# Patient Record
Sex: MALE | Race: WHITE | NOT HISPANIC OR LATINO | Employment: FULL TIME | URBAN - METROPOLITAN AREA
[De-identification: names, ages, dates, MRNs, and addresses within clinical notes are randomized per-mention and may not be internally consistent; named-entity substitution may affect disease eponyms.]

---

## 2023-07-10 DIAGNOSIS — E03.9 HYPOTHYROIDISM (ACQUIRED): Primary | ICD-10-CM

## 2023-07-10 RX ORDER — LEVOTHYROXINE SODIUM 175 UG/1
175 TABLET ORAL DAILY
Qty: 90 TABLET | Refills: 1 | Status: SHIPPED | OUTPATIENT
Start: 2023-07-10 | End: 2024-01-04

## 2023-07-11 DIAGNOSIS — N52.9 IMPOTENCE: Primary | ICD-10-CM

## 2023-07-11 RX ORDER — TESTOSTERONE CYPIONATE 200 MG/ML
200 INJECTION, SOLUTION INTRAMUSCULAR
Qty: 2 ML | Refills: 1 | Status: SHIPPED | OUTPATIENT
Start: 2023-07-11 | End: 2023-07-18 | Stop reason: SDUPTHER

## 2023-07-11 RX ORDER — TESTOSTERONE CYPIONATE 200 MG/ML
INJECTION, SOLUTION INTRAMUSCULAR
COMMUNITY
Start: 2021-03-17 | End: 2023-07-11 | Stop reason: SDUPTHER

## 2023-07-18 ENCOUNTER — TELEPHONE (OUTPATIENT)
Dept: PRIMARY CARE | Facility: CLINIC | Age: 58
End: 2023-07-18
Payer: COMMERCIAL

## 2023-07-18 DIAGNOSIS — N52.9 IMPOTENCE: ICD-10-CM

## 2023-07-18 RX ORDER — TESTOSTERONE CYPIONATE 200 MG/ML
INJECTION, SOLUTION INTRAMUSCULAR
Qty: 6 ML | Refills: 1 | Status: SHIPPED | OUTPATIENT
Start: 2023-07-18 | End: 2023-07-19 | Stop reason: SDUPTHER

## 2023-07-19 ENCOUNTER — TELEPHONE (OUTPATIENT)
Dept: PRIMARY CARE | Facility: CLINIC | Age: 58
End: 2023-07-19
Payer: COMMERCIAL

## 2023-07-19 DIAGNOSIS — N52.9 IMPOTENCE: ICD-10-CM

## 2023-07-19 RX ORDER — TESTOSTERONE CYPIONATE 200 MG/ML
INJECTION, SOLUTION INTRAMUSCULAR
Qty: 6 ML | Refills: 1 | Status: SHIPPED | OUTPATIENT
Start: 2023-07-19 | End: 2023-10-10 | Stop reason: SDUPTHER

## 2023-07-19 NOTE — TELEPHONE ENCOUNTER
THE PHARMACY IS CALLING BECAUSE THE TESTOSTERONE SAYS TO INJECT EVERY 3 WEEKS BUT IT DOESN'T SAY HOW MUCH TO INJECT EVERY 3 WEEKS SO THEY NEED THAT PRESCRIPTION UPDATED AND SENT BACK TO THEM   [phone]

## 2023-07-24 DIAGNOSIS — I10 PRIMARY HYPERTENSION: Primary | ICD-10-CM

## 2023-07-24 RX ORDER — LISINOPRIL AND HYDROCHLOROTHIAZIDE 12.5; 2 MG/1; MG/1
2 TABLET ORAL DAILY
Qty: 180 TABLET | Refills: 1 | Status: SHIPPED | OUTPATIENT
Start: 2023-07-24 | End: 2023-10-10 | Stop reason: SDUPTHER

## 2023-09-25 LAB
ANION GAP IN SER/PLAS: 15 MMOL/L (ref 10–20)
APPEARANCE, URINE: CLEAR
BILIRUBIN, URINE: NEGATIVE
BLOOD, URINE: NEGATIVE
CALCIUM (MG/DL) IN SER/PLAS: 10.1 MG/DL (ref 8.6–10.6)
CARBON DIOXIDE, TOTAL (MMOL/L) IN SER/PLAS: 29 MMOL/L (ref 21–32)
CHLORIDE (MMOL/L) IN SER/PLAS: 99 MMOL/L (ref 98–107)
COLOR, URINE: YELLOW
CREATININE (MG/DL) IN SER/PLAS: 0.98 MG/DL (ref 0.5–1.3)
ERYTHROCYTE DISTRIBUTION WIDTH (RATIO) BY AUTOMATED COUNT: 12.2 % (ref 11.5–14.5)
ERYTHROCYTE MEAN CORPUSCULAR HEMOGLOBIN CONCENTRATION (G/DL) BY AUTOMATED: 32.9 G/DL (ref 32–36)
ERYTHROCYTE MEAN CORPUSCULAR VOLUME (FL) BY AUTOMATED COUNT: 93 FL (ref 80–100)
ERYTHROCYTES (10*6/UL) IN BLOOD BY AUTOMATED COUNT: 5.46 X10E12/L (ref 4.5–5.9)
GFR MALE: 89 ML/MIN/1.73M2
GLUCOSE (MG/DL) IN SER/PLAS: 89 MG/DL (ref 74–99)
GLUCOSE, URINE: NEGATIVE MG/DL
HEMATOCRIT (%) IN BLOOD BY AUTOMATED COUNT: 50.7 % (ref 41–52)
HEMOGLOBIN (G/DL) IN BLOOD: 16.7 G/DL (ref 13.5–17.5)
KETONES, URINE: NEGATIVE MG/DL
LEUKOCYTE ESTERASE, URINE: NEGATIVE
LEUKOCYTES (10*3/UL) IN BLOOD BY AUTOMATED COUNT: 12.1 X10E9/L (ref 4.4–11.3)
NITRITE, URINE: NEGATIVE
NRBC (PER 100 WBCS) BY AUTOMATED COUNT: 0 /100 WBC (ref 0–0)
PH, URINE: 5 (ref 5–8)
PLATELETS (10*3/UL) IN BLOOD AUTOMATED COUNT: 390 X10E9/L (ref 150–450)
POTASSIUM (MMOL/L) IN SER/PLAS: 5 MMOL/L (ref 3.5–5.3)
PROTEIN, URINE: NEGATIVE MG/DL
SODIUM (MMOL/L) IN SER/PLAS: 138 MMOL/L (ref 136–145)
SPECIFIC GRAVITY, URINE: 1.02 (ref 1–1.03)
THYROTROPIN (MIU/L) IN SER/PLAS BY DETECTION LIMIT <= 0.05 MIU/L: 2.4 MIU/L (ref 0.44–3.98)
UREA NITROGEN (MG/DL) IN SER/PLAS: 18 MG/DL (ref 6–23)
UROBILINOGEN, URINE: <2 MG/DL (ref 0–1.9)

## 2023-09-28 LAB — VITAMIN D 1,25-DIHYDROXY: 42.4 PG/ML (ref 19.9–79.3)

## 2023-10-10 ENCOUNTER — OFFICE VISIT (OUTPATIENT)
Dept: PRIMARY CARE | Facility: CLINIC | Age: 58
End: 2023-10-10
Payer: COMMERCIAL

## 2023-10-10 VITALS
WEIGHT: 315 LBS | BODY MASS INDEX: 42.66 KG/M2 | HEIGHT: 72 IN | OXYGEN SATURATION: 94 % | DIASTOLIC BLOOD PRESSURE: 86 MMHG | HEART RATE: 83 BPM | SYSTOLIC BLOOD PRESSURE: 132 MMHG | TEMPERATURE: 98 F

## 2023-10-10 DIAGNOSIS — E78.49 OTHER HYPERLIPIDEMIA: ICD-10-CM

## 2023-10-10 DIAGNOSIS — N52.9 IMPOTENCE: ICD-10-CM

## 2023-10-10 DIAGNOSIS — R73.03 PRE-DIABETES: ICD-10-CM

## 2023-10-10 DIAGNOSIS — L25.9 CONTACT DERMATITIS, UNSPECIFIED CONTACT DERMATITIS TYPE, UNSPECIFIED TRIGGER: ICD-10-CM

## 2023-10-10 DIAGNOSIS — R68.82 LIBIDO, DECREASED: ICD-10-CM

## 2023-10-10 DIAGNOSIS — Z12.5 PROSTATE CANCER SCREENING: ICD-10-CM

## 2023-10-10 DIAGNOSIS — E03.9 ACQUIRED HYPOTHYROIDISM: ICD-10-CM

## 2023-10-10 DIAGNOSIS — E29.1 HYPOGONADISM MALE: ICD-10-CM

## 2023-10-10 DIAGNOSIS — I10 BENIGN ESSENTIAL HYPERTENSION: Primary | ICD-10-CM

## 2023-10-10 DIAGNOSIS — I10 PRIMARY HYPERTENSION: ICD-10-CM

## 2023-10-10 PROBLEM — E78.5 HYPERLIPIDEMIA: Status: ACTIVE | Noted: 2023-10-10

## 2023-10-10 PROCEDURE — 3079F DIAST BP 80-89 MM HG: CPT | Performed by: INTERNAL MEDICINE

## 2023-10-10 PROCEDURE — 3075F SYST BP GE 130 - 139MM HG: CPT | Performed by: INTERNAL MEDICINE

## 2023-10-10 PROCEDURE — 90471 IMMUNIZATION ADMIN: CPT | Performed by: INTERNAL MEDICINE

## 2023-10-10 PROCEDURE — 93000 ELECTROCARDIOGRAM COMPLETE: CPT | Performed by: INTERNAL MEDICINE

## 2023-10-10 PROCEDURE — 99214 OFFICE O/P EST MOD 30 MIN: CPT | Performed by: INTERNAL MEDICINE

## 2023-10-10 PROCEDURE — 90686 IIV4 VACC NO PRSV 0.5 ML IM: CPT | Performed by: INTERNAL MEDICINE

## 2023-10-10 RX ORDER — TRIAMCINOLONE ACETONIDE 1 MG/G
CREAM TOPICAL
COMMUNITY
Start: 2017-11-08 | End: 2023-10-10 | Stop reason: SDUPTHER

## 2023-10-10 RX ORDER — TESTOSTERONE CYPIONATE 200 MG/ML
INJECTION, SOLUTION INTRAMUSCULAR
Qty: 6 ML | Refills: 1 | Status: SHIPPED | OUTPATIENT
Start: 2023-10-10 | End: 2024-02-28 | Stop reason: SDUPTHER

## 2023-10-10 RX ORDER — SILDENAFIL 100 MG/1
TABLET, FILM COATED ORAL
COMMUNITY
Start: 2019-03-13 | End: 2023-10-10 | Stop reason: SDUPTHER

## 2023-10-10 RX ORDER — LISINOPRIL AND HYDROCHLOROTHIAZIDE 12.5; 2 MG/1; MG/1
2 TABLET ORAL DAILY
Qty: 180 TABLET | Refills: 1 | Status: SHIPPED | OUTPATIENT
Start: 2023-10-10 | End: 2024-04-08

## 2023-10-10 RX ORDER — TRIAMCINOLONE ACETONIDE 1 MG/G
CREAM TOPICAL 2 TIMES DAILY
Qty: 45 G | Refills: 1 | Status: SHIPPED | OUTPATIENT
Start: 2023-10-10 | End: 2023-12-09

## 2023-10-10 RX ORDER — SILDENAFIL 100 MG/1
100 TABLET, FILM COATED ORAL DAILY PRN
Qty: 10 TABLET | Refills: 1 | Status: SHIPPED | OUTPATIENT
Start: 2023-10-10 | End: 2024-05-29 | Stop reason: SDUPTHER

## 2023-10-10 ASSESSMENT — PATIENT HEALTH QUESTIONNAIRE - PHQ9
1. LITTLE INTEREST OR PLEASURE IN DOING THINGS: NOT AT ALL
SUM OF ALL RESPONSES TO PHQ9 QUESTIONS 1 AND 2: 0
2. FEELING DOWN, DEPRESSED OR HOPELESS: NOT AT ALL

## 2023-10-10 ASSESSMENT — LIFESTYLE VARIABLES
HOW MANY STANDARD DRINKS CONTAINING ALCOHOL DO YOU HAVE ON A TYPICAL DAY: PATIENT DOES NOT DRINK
AUDIT-C TOTAL SCORE: 0
HOW OFTEN DO YOU HAVE A DRINK CONTAINING ALCOHOL: NEVER
SKIP TO QUESTIONS 9-10: 1
HOW OFTEN DO YOU HAVE SIX OR MORE DRINKS ON ONE OCCASION: NEVER

## 2023-10-10 ASSESSMENT — COLUMBIA-SUICIDE SEVERITY RATING SCALE - C-SSRS: 1. IN THE PAST MONTH, HAVE YOU WISHED YOU WERE DEAD OR WISHED YOU COULD GO TO SLEEP AND NOT WAKE UP?: NO

## 2023-10-10 NOTE — PROGRESS NOTES
Subjective   Patient ID: Jose Franco is a 58 y.o. male who presents for Follow-up.    HPI it on hyperlipidemia, hypogonadism, hypothyroidism, seasonal affective disorder, decreased libido, obesity, erectile dysfunction, hypertension,eczema,allergic rhinitis, coronary artery calcium score of zero in 2020 vitamin D deficiency, benign colon polyp, and ejection fraction of 65% per record done in 2020 .He is doing fairly well and denies any rashes, chest pain, cough, congestion, abdominal pain, dysuria and fever. La     Review of Systems    Objective   /86   Pulse 83   Temp 36.7 °C (98 °F)   Ht 1.829 m (6')   Wt 150 kg (330 lb)   SpO2 94%   BMI 44.76 kg/m²     Physical Exam    Assessment/Plan

## 2023-10-10 NOTE — PROGRESS NOTES
Subjective   Patient ID: Jose Franco is a 58 y.o. male who presents for Follow-up.    HPI patient presents to clinic for  follow-up visit on hyperlipidemia, hypogonadism, hypothyroidism, seasonal affective disorder, decreased libido, obesity, erectile dysfunction, hypertension,eczema,allergic rhinitis, coronary artery calcium score of zero in 2020 vitamin D deficiency, benign colon polyp, and ejection fraction of 65% per record done in 2020 .He is doing well and denies any cough, congestion, abdominal pain nausea vomiting and swelling of legs.  Laboratory studies done shows potassium of 5.0 glucose of 89, serum calcium of 10.1 WBC of 12.1 hemoglobin of 16.7 and other studies are unremarkable.  EKG done shows sinus rhythm at 87 bpm with normal axis normal interval without any ischemic changes.    Review of Systems   Constitutional: Negative.    HENT: Negative.     Eyes: Negative.    Respiratory: Negative.     Cardiovascular: Negative.    Gastrointestinal: Negative.    Endocrine: Negative.    Genitourinary: Negative.    Musculoskeletal: Negative.    Skin: Negative.    Allergic/Immunologic: Negative.    Neurological: Negative.    Hematological: Negative.    Psychiatric/Behavioral: Negative.         Objective   /86   Pulse 83   Temp 36.7 °C (98 °F)   Ht 1.829 m (6')   Wt 150 kg (330 lb)   SpO2 94%   BMI 44.76 kg/m²     Physical Exam  Constitutional:       Appearance: Normal appearance. He is obese.   HENT:      Right Ear: Tympanic membrane normal.      Left Ear: Tympanic membrane and ear canal normal.      Nose: Nose normal.   Neck:      Vascular: No carotid bruit.   Cardiovascular:      Rate and Rhythm: Normal rate.   Pulmonary:      Effort: No respiratory distress.      Breath sounds: No stridor. No wheezing.   Abdominal:      Palpations: Abdomen is soft.      Tenderness: There is no guarding or rebound.   Skin:     Coloration: Skin is not jaundiced.      Findings: No bruising.   Neurological:       General: No focal deficit present.      Mental Status: He is alert and oriented to person, place, and time.   Psychiatric:         Mood and Affect: Mood normal.         Assessment/Plan    patient is encouraged to diet, exercise and do lifestyle modification for obesity.  He will continue current medications and will return to clinic in 6 months for follow-up visit.     OARRS:  No data recorded  Yes, I feel it is clincially indicated to continue the medication and have discussed with the patient risks/benefits/alternatives.    Is the patient prescribed a combination of a benzodiazepine and opioid?  Yes, I feel it is clincially indicated to continue the medication and have discussed with the patient risks/benefits/alternatives.    Last Urine Drug Screen / ordered today: Yes  Recent Results (from the past 8760 hour(s))   OPIATE/OPIOID/BENZO PRESCRIPTION COMPLIANCE    Collection Time: 02/02/23  9:17 AM   Result Value Ref Range    DRUG SCREEN COMMENT URINE SEE BELOW     Creatine, Urine 35.5 mg/dL    Amphetamine Screen, Urine PRESUMPTIVE NEGATIVE NEGATIVE    Barbiturate Screen, Urine PRESUMPTIVE NEGATIVE NEGATIVE    Cannabinoid Screen, Urine PRESUMPTIVE NEGATIVE NEGATIVE    Cocaine Screen, Urine PRESUMPTIVE NEGATIVE NEGATIVE    PCP Screen, Urine PRESUMPTIVE NEGATIVE NEGATIVE    7-Aminoclonazepam <25 Cutoff <25 ng/mL    Alpha-Hydroxyalprazolam <25 Cutoff <25 ng/mL    Alpha-Hydroxymidazolam <25 Cutoff <25 ng/mL    Alprazolam <25 Cutoff <25 ng/mL    Chlordiazepoxide <25 Cutoff <25 ng/mL    Clonazepam <25 Cutoff <25 ng/mL    Diazepam <25 Cutoff <25 ng/mL    Lorazepam <25 Cutoff <25 ng/mL    Midazolam <25 Cutoff <25 ng/mL    Nordiazepam <25 Cutoff <25 ng/mL    Oxazepam <25 Cutoff <25 ng/mL    Temazepam <25 Cutoff <25 ng/mL    Zolpidem <25 Cutoff <25 ng/mL    Zolpidem Metabolite (ZCA) <25 Cutoff <25 ng/mL    6-Acetylmorphine <25 Cutoff <25 ng/mL    Codeine <50 Cutoff <50 ng/mL    Hydrocodone <25 Cutoff <25 ng/mL     Hydromorphone <25 Cutoff <25 ng/mL    Morphine Urine <50 Cutoff <50 ng/mL    Norhydrocodone <25 Cutoff <25 ng/mL    Noroxycodone <25 Cutoff <25 ng/mL    Oxycodone <25 Cutoff <25 ng/mL    Oxymorphone <25 Cutoff <25 ng/mL    Tramadol <50 Cutoff <50 ng/mL    O-Desmethyltramadol <50 Cutoff <50 ng/mL    Fentanyl <2.5 Cutoff<2.5 ng/mL    Norfentanyl <2.5 Cutoff<2.5 ng/mL    METHADONE CONFIRMATION,URINE <25 Cutoff <25 ng/mL    EDDP <25 Cutoff <25 ng/mL     Results are as expected.         Controlled Substance Agreement:  Date of the Last Agreement: 10/10/23  Reviewed Controlled Substance Agreement including but not limited to the benefits, risks, and alternatives to treatment with a Controlled Substance medication(s).    Testosterone:  What is the patient's goal of therapy? Helps with decrease libido  Is this being achieved with current treatment? yes    I attest the patient does not have: Breast Cancer    Last Testosterone check:  Testosterone, Free   Date Value Ref Range Status   02/02/2023 119.6 35.0 - 155.0 pg/mL Final     Comment:     This test was developed and its analytical performance  characteristics have been determined by [place] It has  not been cleared or approved by the U.S. Food and Drug  Administration. This assay has been validated pursuant  to the CLIA regulations and is used for clinical  purposes.       Testosterone, Total, LC-MS/MS   Date Value Ref Range Status   02/02/2023 541 250 - 1,100 ng/dL Final     Comment:     For additional information, please refer to  http://education.[employer]/faq/  [id number]  (This link is being provided for informational/  educational purposes only.)     This test was developed and its analytical performance  characteristics have been determined by [facility] [place], [place]. It has  not been cleared or approved by the U.S. Food and Drug  Administration. This assay has been  "validated pursuant  to the CLIA regulations and is used for clinical  purposes.         Last CBC:    No results found for: \"CBCDIF\", \"BMCBC\", \"PR1\"    Last PSA:   PSA   Date Value Ref Range Status   05/06/2020 2.81 0.00 - 4.00 ng/mL Final     Comment:     The FDA requires that the method used for PSA assay be   reported to the physician. Values obtained with different   assay methods must not be used interchangeably. This test   was performed at [facility] using the Siemens  Atellica PSA method, which is a sandwich immunoassay using   chemiluminescence for quantitation. The assay is approved  for measurement of prostate-specific antigen (PSA) in   serum and may be used in conjunction with a digital rectal  examination in men 50 years and older as an aid in   detection of prostate cancer.   5-Alpha-reductase inhibitors (e.g. Proscar, Finasteride,   Avodart, Dutasteride and Laura) for the treatment of BPH   have been shown to lower PSA levels by an average of 50%   after 6 months of treatment.       Prostate Specific Antigen,Screen   Date Value Ref Range Status   02/02/2023 3.39 0.00 - 4.00 ng/mL Final     Comment:     The FDA requires that the method used for PSA assay be   reported to the physician. Values obtained with different   assay methods must not be used interchangeably. This test   was performed at [facility] using the Siemens  Atellica PSA method, which is a sandwich immunoassay using   chemiluminescence for quantitation. The assay is approved  for measurement of prostate-specific antigen (PSA) in   serum and may be used in conjunction with a digital rectal  examination in men 50 years and older as an aid in   detection of prostate cancer.   5-Alpha-reductase inhibitors (e.g. Proscar, Finasteride,   Avodart, Dutasteride and Laura) for the treatment of BPH   have been shown to lower PSA levels by an average of 50%   after 6 months of treatment.         Activities of Daily " Living:   Is your overall impression that this patient is benefiting (symptom reduction outweighs side effects) from testosterone therapy? Yes     1. Physical Functioning: Better  2. Family Relationship: Better  3. Social Relationship: Better  4. Mood: Better  5. Sleep Patterns: Better  6. Overall Function: Better

## 2023-10-10 NOTE — PROGRESS NOTES
Subjective   Patient ID: Jose Franco is a 58 y.o. male who presents for Follow-up.    HPI     Review of Systems    Objective   /86   Pulse 83   Temp 36.7 °C (98 °F)   Ht 1.829 m (6')   Wt 150 kg (330 lb)   SpO2 94%   BMI 44.76 kg/m²     Physical Exam    Assessment/Plan

## 2023-10-12 ASSESSMENT — ENCOUNTER SYMPTOMS
EYES NEGATIVE: 1
CONSTITUTIONAL NEGATIVE: 1
HEMATOLOGIC/LYMPHATIC NEGATIVE: 1
GASTROINTESTINAL NEGATIVE: 1
ALLERGIC/IMMUNOLOGIC NEGATIVE: 1
CARDIOVASCULAR NEGATIVE: 1
NEUROLOGICAL NEGATIVE: 1
MUSCULOSKELETAL NEGATIVE: 1
ENDOCRINE NEGATIVE: 1
RESPIRATORY NEGATIVE: 1
PSYCHIATRIC NEGATIVE: 1

## 2024-01-04 DIAGNOSIS — E03.9 HYPOTHYROIDISM (ACQUIRED): ICD-10-CM

## 2024-01-04 RX ORDER — LEVOTHYROXINE SODIUM 175 UG/1
175 TABLET ORAL DAILY
Qty: 90 TABLET | Refills: 3 | Status: SHIPPED | OUTPATIENT
Start: 2024-01-04 | End: 2024-05-29 | Stop reason: SDUPTHER

## 2024-02-28 DIAGNOSIS — N52.9 IMPOTENCE: ICD-10-CM

## 2024-02-28 RX ORDER — SYRINGE WITH NEEDLE, 1 ML 25GX5/8"
SYRINGE, EMPTY DISPOSABLE MISCELLANEOUS
COMMUNITY
Start: 2023-10-24 | End: 2024-02-28 | Stop reason: SDUPTHER

## 2024-02-29 RX ORDER — TESTOSTERONE CYPIONATE 200 MG/ML
INJECTION, SOLUTION INTRAMUSCULAR
Qty: 6 ML | Refills: 1 | Status: SHIPPED | OUTPATIENT
Start: 2024-02-29

## 2024-02-29 RX ORDER — SYRINGE WITH NEEDLE, 1 ML 25GX5/8"
1 SYRINGE, EMPTY DISPOSABLE MISCELLANEOUS DAILY
Qty: 30 EACH | Refills: 0 | Status: SHIPPED | OUTPATIENT
Start: 2024-02-29

## 2024-04-08 DIAGNOSIS — I10 PRIMARY HYPERTENSION: ICD-10-CM

## 2024-04-08 RX ORDER — LISINOPRIL AND HYDROCHLOROTHIAZIDE 12.5; 2 MG/1; MG/1
2 TABLET ORAL DAILY
Qty: 180 TABLET | Refills: 3 | Status: SHIPPED | OUTPATIENT
Start: 2024-04-08 | End: 2024-05-29 | Stop reason: SDUPTHER

## 2024-04-17 ENCOUNTER — LAB (OUTPATIENT)
Dept: LAB | Facility: LAB | Age: 59
End: 2024-04-17
Payer: COMMERCIAL

## 2024-04-17 DIAGNOSIS — R73.03 PRE-DIABETES: ICD-10-CM

## 2024-04-17 DIAGNOSIS — Z12.5 PROSTATE CANCER SCREENING: ICD-10-CM

## 2024-04-17 DIAGNOSIS — E29.1 HYPOGONADISM MALE: ICD-10-CM

## 2024-04-17 DIAGNOSIS — I10 BENIGN ESSENTIAL HYPERTENSION: ICD-10-CM

## 2024-04-17 DIAGNOSIS — E78.49 OTHER HYPERLIPIDEMIA: ICD-10-CM

## 2024-04-17 LAB
ALBUMIN SERPL BCP-MCNC: 4.5 G/DL (ref 3.4–5)
ALP SERPL-CCNC: 47 U/L (ref 33–120)
ALT SERPL W P-5'-P-CCNC: 34 U/L (ref 10–52)
ANION GAP SERPL CALC-SCNC: 17 MMOL/L (ref 10–20)
APPEARANCE UR: CLEAR
AST SERPL W P-5'-P-CCNC: 23 U/L (ref 9–39)
BASOPHILS # BLD AUTO: 0.1 X10*3/UL (ref 0–0.1)
BASOPHILS NFR BLD AUTO: 1 %
BILIRUB SERPL-MCNC: 0.7 MG/DL (ref 0–1.2)
BILIRUB UR STRIP.AUTO-MCNC: NEGATIVE MG/DL
BUN SERPL-MCNC: 21 MG/DL (ref 6–23)
CALCIUM SERPL-MCNC: 10.1 MG/DL (ref 8.6–10.6)
CHLORIDE SERPL-SCNC: 100 MMOL/L (ref 98–107)
CHOLEST SERPL-MCNC: 195 MG/DL (ref 0–199)
CHOLESTEROL/HDL RATIO: 4.5
CO2 SERPL-SCNC: 27 MMOL/L (ref 21–32)
COLOR UR: NORMAL
CREAT SERPL-MCNC: 0.98 MG/DL (ref 0.5–1.3)
EGFRCR SERPLBLD CKD-EPI 2021: 89 ML/MIN/1.73M*2
EOSINOPHIL # BLD AUTO: 0.18 X10*3/UL (ref 0–0.7)
EOSINOPHIL NFR BLD AUTO: 1.7 %
ERYTHROCYTE [DISTWIDTH] IN BLOOD BY AUTOMATED COUNT: 12.6 % (ref 11.5–14.5)
EST. AVERAGE GLUCOSE BLD GHB EST-MCNC: 111 MG/DL
GLUCOSE SERPL-MCNC: 90 MG/DL (ref 74–99)
GLUCOSE UR STRIP.AUTO-MCNC: NORMAL MG/DL
HBA1C MFR BLD: 5.5 %
HCT VFR BLD AUTO: 51.8 % (ref 41–52)
HDLC SERPL-MCNC: 43.2 MG/DL
HGB BLD-MCNC: 17.4 G/DL (ref 13.5–17.5)
IMM GRANULOCYTES # BLD AUTO: 0.06 X10*3/UL (ref 0–0.7)
IMM GRANULOCYTES NFR BLD AUTO: 0.6 % (ref 0–0.9)
KETONES UR STRIP.AUTO-MCNC: NEGATIVE MG/DL
LDLC SERPL CALC-MCNC: 129 MG/DL
LEUKOCYTE ESTERASE UR QL STRIP.AUTO: NEGATIVE
LYMPHOCYTES # BLD AUTO: 2.62 X10*3/UL (ref 1.2–4.8)
LYMPHOCYTES NFR BLD AUTO: 25 %
MCH RBC QN AUTO: 30.5 PG (ref 26–34)
MCHC RBC AUTO-ENTMCNC: 33.6 G/DL (ref 32–36)
MCV RBC AUTO: 91 FL (ref 80–100)
MONOCYTES # BLD AUTO: 0.88 X10*3/UL (ref 0.1–1)
MONOCYTES NFR BLD AUTO: 8.4 %
NEUTROPHILS # BLD AUTO: 6.64 X10*3/UL (ref 1.2–7.7)
NEUTROPHILS NFR BLD AUTO: 63.3 %
NITRITE UR QL STRIP.AUTO: NEGATIVE
NON HDL CHOLESTEROL: 152 MG/DL (ref 0–149)
NRBC BLD-RTO: 0 /100 WBCS (ref 0–0)
PH UR STRIP.AUTO: 6.5 [PH]
PLATELET # BLD AUTO: 399 X10*3/UL (ref 150–450)
POTASSIUM SERPL-SCNC: 4.5 MMOL/L (ref 3.5–5.3)
PROT SERPL-MCNC: 7.4 G/DL (ref 6.4–8.2)
PROT UR STRIP.AUTO-MCNC: NEGATIVE MG/DL
PSA SERPL-MCNC: 3.32 NG/ML
RBC # BLD AUTO: 5.71 X10*6/UL (ref 4.5–5.9)
RBC # UR STRIP.AUTO: NEGATIVE /UL
SODIUM SERPL-SCNC: 139 MMOL/L (ref 136–145)
SP GR UR STRIP.AUTO: 1.02
TRIGL SERPL-MCNC: 116 MG/DL (ref 0–149)
UROBILINOGEN UR STRIP.AUTO-MCNC: NORMAL MG/DL
VLDL: 23 MG/DL (ref 0–40)
WBC # BLD AUTO: 10.5 X10*3/UL (ref 4.4–11.3)

## 2024-04-17 PROCEDURE — 83036 HEMOGLOBIN GLYCOSYLATED A1C: CPT

## 2024-04-17 PROCEDURE — 81003 URINALYSIS AUTO W/O SCOPE: CPT

## 2024-04-17 PROCEDURE — 84153 ASSAY OF PSA TOTAL: CPT

## 2024-04-17 PROCEDURE — 84402 ASSAY OF FREE TESTOSTERONE: CPT

## 2024-04-17 PROCEDURE — 80061 LIPID PANEL: CPT

## 2024-04-17 PROCEDURE — 80053 COMPREHEN METABOLIC PANEL: CPT

## 2024-04-17 PROCEDURE — 85025 COMPLETE CBC W/AUTO DIFF WBC: CPT

## 2024-04-17 PROCEDURE — 36415 COLL VENOUS BLD VENIPUNCTURE: CPT

## 2024-04-21 LAB
TESTOSTERONE FREE (CHAN): 127.3 PG/ML (ref 35–155)
TESTOSTERONE,TOTAL,LC-MS/MS: 486 NG/DL (ref 250–1100)

## 2024-05-29 ENCOUNTER — OFFICE VISIT (OUTPATIENT)
Dept: PRIMARY CARE | Facility: CLINIC | Age: 59
End: 2024-05-29
Payer: COMMERCIAL

## 2024-05-29 VITALS
HEIGHT: 72 IN | OXYGEN SATURATION: 95 % | SYSTOLIC BLOOD PRESSURE: 100 MMHG | BODY MASS INDEX: 42.66 KG/M2 | HEART RATE: 85 BPM | WEIGHT: 315 LBS | DIASTOLIC BLOOD PRESSURE: 63 MMHG

## 2024-05-29 DIAGNOSIS — E78.49 OTHER HYPERLIPIDEMIA: ICD-10-CM

## 2024-05-29 DIAGNOSIS — I10 PRIMARY HYPERTENSION: ICD-10-CM

## 2024-05-29 DIAGNOSIS — E03.9 ACQUIRED HYPOTHYROIDISM: Primary | ICD-10-CM

## 2024-05-29 DIAGNOSIS — I10 BENIGN ESSENTIAL HYPERTENSION: ICD-10-CM

## 2024-05-29 DIAGNOSIS — E66.9 OBESITY WITH SERIOUS COMORBIDITY, UNSPECIFIED CLASSIFICATION, UNSPECIFIED OBESITY TYPE: ICD-10-CM

## 2024-05-29 DIAGNOSIS — R68.82 LIBIDO, DECREASED: ICD-10-CM

## 2024-05-29 DIAGNOSIS — E29.1 HYPOGONADISM MALE: ICD-10-CM

## 2024-05-29 DIAGNOSIS — E03.9 HYPOTHYROIDISM (ACQUIRED): ICD-10-CM

## 2024-05-29 PROCEDURE — 3074F SYST BP LT 130 MM HG: CPT | Performed by: INTERNAL MEDICINE

## 2024-05-29 PROCEDURE — 3078F DIAST BP <80 MM HG: CPT | Performed by: INTERNAL MEDICINE

## 2024-05-29 PROCEDURE — 99214 OFFICE O/P EST MOD 30 MIN: CPT | Performed by: INTERNAL MEDICINE

## 2024-05-29 RX ORDER — LISINOPRIL AND HYDROCHLOROTHIAZIDE 12.5; 2 MG/1; MG/1
2 TABLET ORAL DAILY
Qty: 180 TABLET | Refills: 3 | Status: SHIPPED | OUTPATIENT
Start: 2024-05-29

## 2024-05-29 RX ORDER — SILDENAFIL 100 MG/1
100 TABLET, FILM COATED ORAL DAILY PRN
Qty: 10 TABLET | Refills: 3 | Status: SHIPPED | OUTPATIENT
Start: 2024-05-29 | End: 2024-07-28

## 2024-05-29 RX ORDER — SEMAGLUTIDE 0.25 MG/.5ML
0.25 INJECTION, SOLUTION SUBCUTANEOUS
Qty: 6 ML | Refills: 1 | Status: SHIPPED | OUTPATIENT
Start: 2024-06-02 | End: 2024-05-31 | Stop reason: SDUPTHER

## 2024-05-29 RX ORDER — LEVOTHYROXINE SODIUM 175 UG/1
175 TABLET ORAL DAILY
Qty: 90 TABLET | Refills: 3 | Status: SHIPPED | OUTPATIENT
Start: 2024-05-29

## 2024-05-29 NOTE — PROGRESS NOTES
Subjective   Patient ID: Jose Franco is a 58 y.o. male who presents for Follow-up (On blood work /And wants weight los wegovy ) and Med Refill (sildenafil (Viagra) 100 mg has to go to [facility] ).    HPI patient presents to clinic for follow-up visit on history of hyperlipidemia, hypogonadism, hypothyroidism, seasonal affective disorder, decreased libido, obesity, erectile dysfunction, hypertension,eczema,allergic rhinitis, coronary artery calcium score of zero in 2020 vitamin D deficiency, benign colon polyp, and ejection fraction of 65% per record done in 2020 .He is doing fairly well and is requesting weight loss medication in view of morbid obesity.  He denies any rashes, chest pain, cough, congestion, abdominal pain fever chills and palpitation.  Laboratory studies done shows hemoglobin of 17.4, cholesterol of 195, glucose of 90, hemoglobin A1c of 5.5% and other studies have been reviewed and discussed with him.  OARRS:  No data recorded  I have personally reviewed the OARRS report for Yassine Franco. I have considered the risks of abuse, dependence, addiction and diversion    Is the patient prescribed a combination of a benzodiazepine and opioid?  Yes, I feel it is clincially indicated to continue the medication and have discussed with the patient risks/benefits/alternatives.    Last Urine Drug Screen / ordered today: Yes  No results found for this or any previous visit (from the past 8760 hour(s)).  N/A          Controlled Substance Agreement:  Date of the Last Agreement: 10/10/23  Reviewed Controlled Substance Agreement including but not limited to the benefits, risks, and alternatives to treatment with a Controlled Substance medication(s).    Testosterone:  What is the patient's goal of therapy? To help erectile dysfunction  Is this being achieved with current treatment? yes    I attest the patient does not have: Breast Cancer    Last Testosterone check:  Testosterone, Free   Date Value Ref Range Status  "  04/17/2024 127.3 35.0 - 155.0 pg/mL Final     Comment:        This test was developed and its analytical performance  characteristics have been determined by [place]. It has  not been cleared or approved by the U.S. Food and Drug  Administration. This assay has been validated pursuant  to the CLIA regulations and is used for clinical  purposes.            Testosterone, Total, LC-MS/MS   Date Value Ref Range Status   04/17/2024 486 250 - 1100 ng/dL Final     Comment:        For additional information, please refer to  http://education.[employer]/faq/  [id number]  (This link is being provided for informational/  educational purposes only.)     This test was developed and its analytical performance  characteristics have been determined by [place]. It has  not been cleared or approved by the U.S. Food and Drug  Administration. This assay has been validated pursuant  to the CLIA regulations and is used for clinical  purposes.              Last CBC:    No results found for: \"CBCDIF\", \"BMCBC\", \"PR1\"    Last PSA:   PSA   Date Value Ref Range Status   05/06/2020 2.81 0.00 - 4.00 ng/mL Final     Comment:     The FDA requires that the method used for PSA assay be   reported to the physician. Values obtained with different   assay methods must not be used interchangeably. This test   was performed at [facility] using the Siemens  Atellica PSA method, which is a sandwich immunoassay using   chemiluminescence for quantitation. The assay is approved  for measurement of prostate-specific antigen (PSA) in   serum and may be used in conjunction with a digital rectal  examination in men 50 years and older as an aid in   detection of prostate cancer.   5-Alpha-reductase inhibitors (e.g. Proscar, Finasteride,   Avodart, Dutasteride and Laura) for the treatment of BPH   have been shown to lower PSA levels " by an average of 50%   after 6 months of treatment.       Prostate Specific Antigen,Screen   Date Value Ref Range Status   04/17/2024 3.32 <=4.00 ng/mL Final       Activities of Daily Living:   Is your overall impression that this patient is benefiting (symptom reduction outweighs side effects) from testosterone therapy? Yes     1. Physical Functioning: Better  2. Family Relationship: Better  3. Social Relationship: Better  4. Mood: Better  5. Sleep Patterns: Same  6. Overall Function: Same      Review of Systems   Constitutional: Negative.    HENT: Negative.     Eyes: Negative.    Respiratory: Negative.     Cardiovascular: Negative.    Gastrointestinal: Negative.    Endocrine: Negative.    Genitourinary: Negative.    Musculoskeletal: Negative.    Skin: Negative.    Allergic/Immunologic: Negative.    Neurological: Negative.    Hematological: Negative.    Psychiatric/Behavioral: Negative.         Objective   /63 (BP Location: Right arm, Patient Position: Sitting, BP Cuff Size: Adult)   Pulse 85   Ht 1.829 m (6')   Wt (!) 153 kg (338 lb)   SpO2 95%   BMI 45.84 kg/m²     Physical Exam  Constitutional:       Appearance: Normal appearance. He is obese.   HENT:      Right Ear: Tympanic membrane normal.      Left Ear: Tympanic membrane and ear canal normal.      Nose: Nose normal.   Neck:      Vascular: No carotid bruit.   Cardiovascular:      Rate and Rhythm: Normal rate.   Pulmonary:      Effort: No respiratory distress.      Breath sounds: No stridor. No wheezing.   Abdominal:      Palpations: Abdomen is soft.      Tenderness: There is no abdominal tenderness. There is no guarding or rebound.   Skin:     Coloration: Skin is not jaundiced.      Findings: No bruising.   Neurological:      General: No focal deficit present.      Mental Status: He is alert and oriented to person, place, and time.   Psychiatric:         Mood and Affect: Mood normal.         Assessment/Plan    patient vies to follow Mediterranean  diet, exercise and do lifestyle modification and is given trial with Wegovy regarding obesity.  He will continue other medications and will return to clinic in 3 months for follow-up visit.

## 2024-05-30 ENCOUNTER — TELEPHONE (OUTPATIENT)
Dept: PRIMARY CARE | Facility: CLINIC | Age: 59
End: 2024-05-30
Payer: COMMERCIAL

## 2024-05-30 DIAGNOSIS — E66.9 OBESITY WITH SERIOUS COMORBIDITY, UNSPECIFIED CLASSIFICATION, UNSPECIFIED OBESITY TYPE: ICD-10-CM

## 2024-05-31 ENCOUNTER — PHARMACY VISIT (OUTPATIENT)
Dept: PHARMACY | Facility: CLINIC | Age: 59
End: 2024-05-31
Payer: COMMERCIAL

## 2024-05-31 PROCEDURE — RXMED WILLOW AMBULATORY MEDICATION CHARGE

## 2024-05-31 RX ORDER — SEMAGLUTIDE 0.25 MG/.5ML
0.25 INJECTION, SOLUTION SUBCUTANEOUS
Qty: 2 ML | Refills: 3 | Status: SHIPPED | OUTPATIENT
Start: 2024-06-02 | End: 2025-06-02

## 2024-06-02 ASSESSMENT — ENCOUNTER SYMPTOMS
PSYCHIATRIC NEGATIVE: 1
CARDIOVASCULAR NEGATIVE: 1
RESPIRATORY NEGATIVE: 1
EYES NEGATIVE: 1
MUSCULOSKELETAL NEGATIVE: 1
ENDOCRINE NEGATIVE: 1
HEMATOLOGIC/LYMPHATIC NEGATIVE: 1
ALLERGIC/IMMUNOLOGIC NEGATIVE: 1
CONSTITUTIONAL NEGATIVE: 1
GASTROINTESTINAL NEGATIVE: 1
NEUROLOGICAL NEGATIVE: 1

## 2024-06-14 ENCOUNTER — PHARMACY VISIT (OUTPATIENT)
Dept: PHARMACY | Facility: CLINIC | Age: 59
End: 2024-06-14
Payer: COMMERCIAL

## 2024-06-14 DIAGNOSIS — E66.9 OBESITY WITH SERIOUS COMORBIDITY, UNSPECIFIED CLASSIFICATION, UNSPECIFIED OBESITY TYPE: ICD-10-CM

## 2024-06-14 PROCEDURE — RXMED WILLOW AMBULATORY MEDICATION CHARGE

## 2024-06-14 RX ORDER — SEMAGLUTIDE 0.25 MG/.5ML
0.5 INJECTION, SOLUTION SUBCUTANEOUS
Qty: 4 ML | Refills: 0 | Status: SHIPPED | OUTPATIENT
Start: 2024-06-16 | End: 2024-06-14 | Stop reason: SDUPTHER

## 2024-06-14 RX ORDER — SEMAGLUTIDE 0.25 MG/.5ML
0.5 INJECTION, SOLUTION SUBCUTANEOUS
Qty: 2 ML | Refills: 0 | Status: SHIPPED | OUTPATIENT
Start: 2024-06-14 | End: 2024-06-14 | Stop reason: SDUPTHER

## 2024-06-14 RX ORDER — SEMAGLUTIDE 0.5 MG/.5ML
0.5 INJECTION, SOLUTION SUBCUTANEOUS
Qty: 2 ML | Refills: 0 | Status: SHIPPED | OUTPATIENT
Start: 2024-06-16 | End: 2025-06-16

## 2024-07-08 DIAGNOSIS — E66.9 OBESITY WITH SERIOUS COMORBIDITY, UNSPECIFIED CLASSIFICATION, UNSPECIFIED OBESITY TYPE: Primary | ICD-10-CM

## 2024-07-08 DIAGNOSIS — I10 BENIGN ESSENTIAL HYPERTENSION: ICD-10-CM

## 2024-07-08 RX ORDER — SEMAGLUTIDE 1 MG/.5ML
1 INJECTION, SOLUTION SUBCUTANEOUS
Qty: 2 ML | Refills: 0 | Status: CANCELLED | OUTPATIENT
Start: 2024-07-14 | End: 2024-08-05

## 2024-07-09 PROCEDURE — RXMED WILLOW AMBULATORY MEDICATION CHARGE

## 2024-07-09 RX ORDER — SEMAGLUTIDE 1 MG/.5ML
1 INJECTION, SOLUTION SUBCUTANEOUS
Qty: 2 ML | Refills: 0 | Status: SHIPPED | OUTPATIENT
Start: 2024-07-14 | End: 2024-08-07

## 2024-07-10 ENCOUNTER — PHARMACY VISIT (OUTPATIENT)
Dept: PHARMACY | Facility: CLINIC | Age: 59
End: 2024-07-10
Payer: COMMERCIAL

## 2024-08-05 DIAGNOSIS — E78.49 OTHER HYPERLIPIDEMIA: Primary | ICD-10-CM

## 2024-08-05 RX ORDER — SEMAGLUTIDE 1.7 MG/.75ML
1.7 INJECTION, SOLUTION SUBCUTANEOUS
Qty: 3 ML | Refills: 0 | Status: SHIPPED | OUTPATIENT
Start: 2024-08-11 | End: 2024-09-02

## 2024-08-29 ENCOUNTER — LAB (OUTPATIENT)
Dept: LAB | Facility: LAB | Age: 59
End: 2024-08-29
Payer: COMMERCIAL

## 2024-08-29 DIAGNOSIS — E29.1 HYPOGONADISM MALE: ICD-10-CM

## 2024-08-29 DIAGNOSIS — E03.9 ACQUIRED HYPOTHYROIDISM: ICD-10-CM

## 2024-08-29 DIAGNOSIS — I10 BENIGN ESSENTIAL HYPERTENSION: ICD-10-CM

## 2024-08-29 LAB
AMPHETAMINES UR QL SCN: NORMAL
ANION GAP SERPL CALC-SCNC: 15 MMOL/L (ref 10–20)
BARBITURATES UR QL SCN: NORMAL
BUN SERPL-MCNC: 15 MG/DL (ref 6–23)
BZE UR QL SCN: NORMAL
CALCIUM SERPL-MCNC: 9.9 MG/DL (ref 8.6–10.6)
CANNABINOIDS UR QL SCN: NORMAL
CHLORIDE SERPL-SCNC: 100 MMOL/L (ref 98–107)
CO2 SERPL-SCNC: 25 MMOL/L (ref 21–32)
CREAT SERPL-MCNC: 0.85 MG/DL (ref 0.5–1.3)
CREAT UR-MCNC: 76.3 MG/DL (ref 20–370)
EGFRCR SERPLBLD CKD-EPI 2021: >90 ML/MIN/1.73M*2
GLUCOSE SERPL-MCNC: 93 MG/DL (ref 74–99)
PCP UR QL SCN: NORMAL
POTASSIUM SERPL-SCNC: 4.3 MMOL/L (ref 3.5–5.3)
SODIUM SERPL-SCNC: 136 MMOL/L (ref 136–145)
TSH SERPL-ACNC: 0.44 MIU/L (ref 0.44–3.98)

## 2024-08-29 PROCEDURE — 36415 COLL VENOUS BLD VENIPUNCTURE: CPT

## 2024-08-29 PROCEDURE — 84443 ASSAY THYROID STIM HORMONE: CPT

## 2024-08-29 PROCEDURE — 80307 DRUG TEST PRSMV CHEM ANLYZR: CPT

## 2024-08-29 PROCEDURE — 80358 DRUG SCREENING METHADONE: CPT

## 2024-08-29 PROCEDURE — 80361 OPIATES 1 OR MORE: CPT

## 2024-08-29 PROCEDURE — 80354 DRUG SCREENING FENTANYL: CPT

## 2024-08-29 PROCEDURE — 80368 SEDATIVE HYPNOTICS: CPT

## 2024-08-29 PROCEDURE — 82570 ASSAY OF URINE CREATININE: CPT

## 2024-08-29 PROCEDURE — 80373 DRUG SCREENING TRAMADOL: CPT

## 2024-08-29 PROCEDURE — 80346 BENZODIAZEPINES1-12: CPT

## 2024-08-29 PROCEDURE — 80048 BASIC METABOLIC PNL TOTAL CA: CPT

## 2024-08-29 PROCEDURE — 80365 DRUG SCREENING OXYCODONE: CPT

## 2024-09-03 DIAGNOSIS — E29.1 HYPOGONADISM MALE: ICD-10-CM

## 2024-09-03 DIAGNOSIS — R73.03 PRE-DIABETES: ICD-10-CM

## 2024-09-03 DIAGNOSIS — N52.9 IMPOTENCE: ICD-10-CM

## 2024-09-03 DIAGNOSIS — E78.49 OTHER HYPERLIPIDEMIA: Primary | ICD-10-CM

## 2024-09-03 PROCEDURE — RXMED WILLOW AMBULATORY MEDICATION CHARGE

## 2024-09-03 RX ORDER — SEMAGLUTIDE 2.4 MG/.75ML
2.4 INJECTION, SOLUTION SUBCUTANEOUS WEEKLY
Qty: 3 ML | Refills: 0 | Status: SHIPPED | OUTPATIENT
Start: 2024-09-03 | End: 2024-10-02

## 2024-09-03 RX ORDER — TESTOSTERONE CYPIONATE 200 MG/ML
INJECTION, SOLUTION INTRAMUSCULAR
Qty: 6 ML | Refills: 1 | Status: SHIPPED | OUTPATIENT
Start: 2024-09-03

## 2024-09-04 ENCOUNTER — PHARMACY VISIT (OUTPATIENT)
Dept: PHARMACY | Facility: CLINIC | Age: 59
End: 2024-09-04
Payer: COMMERCIAL

## 2024-09-05 ENCOUNTER — PHARMACY VISIT (OUTPATIENT)
Dept: PHARMACY | Facility: CLINIC | Age: 59
End: 2024-09-05

## 2024-09-20 ENCOUNTER — APPOINTMENT (OUTPATIENT)
Dept: PRIMARY CARE | Facility: CLINIC | Age: 59
End: 2024-09-20
Payer: COMMERCIAL

## 2024-09-20 VITALS
HEART RATE: 77 BPM | DIASTOLIC BLOOD PRESSURE: 78 MMHG | BODY MASS INDEX: 37.52 KG/M2 | WEIGHT: 277 LBS | OXYGEN SATURATION: 96 % | HEIGHT: 72 IN | SYSTOLIC BLOOD PRESSURE: 117 MMHG

## 2024-09-20 DIAGNOSIS — N52.9 IMPOTENCE: ICD-10-CM

## 2024-09-20 DIAGNOSIS — E29.1 HYPOGONADISM MALE: ICD-10-CM

## 2024-09-20 DIAGNOSIS — L25.9 CONTACT DERMATITIS, UNSPECIFIED CONTACT DERMATITIS TYPE, UNSPECIFIED TRIGGER: ICD-10-CM

## 2024-09-20 DIAGNOSIS — E66.9 OBESITY WITH SERIOUS COMORBIDITY, UNSPECIFIED CLASSIFICATION, UNSPECIFIED OBESITY TYPE: ICD-10-CM

## 2024-09-20 DIAGNOSIS — I10 BENIGN ESSENTIAL HYPERTENSION: ICD-10-CM

## 2024-09-20 DIAGNOSIS — E78.49 OTHER HYPERLIPIDEMIA: ICD-10-CM

## 2024-09-20 DIAGNOSIS — E03.9 ACQUIRED HYPOTHYROIDISM: Primary | ICD-10-CM

## 2024-09-20 DIAGNOSIS — R73.03 PRE-DIABETES: ICD-10-CM

## 2024-09-20 DIAGNOSIS — I10 PRIMARY HYPERTENSION: ICD-10-CM

## 2024-09-20 RX ORDER — SEMAGLUTIDE 2.4 MG/.75ML
2.4 INJECTION, SOLUTION SUBCUTANEOUS WEEKLY
Qty: 9 ML | Refills: 0 | Status: SHIPPED | OUTPATIENT
Start: 2024-09-20 | End: 2025-09-20

## 2024-09-20 RX ORDER — TRIAMCINOLONE ACETONIDE 1 MG/G
CREAM TOPICAL 2 TIMES DAILY
Qty: 80 G | Refills: 1 | Status: SHIPPED | OUTPATIENT
Start: 2024-09-20

## 2024-09-20 RX ORDER — LISINOPRIL AND HYDROCHLOROTHIAZIDE 12.5; 2 MG/1; MG/1
1 TABLET ORAL DAILY
Qty: 180 TABLET | Refills: 1 | Status: SHIPPED | OUTPATIENT
Start: 2024-09-20

## 2024-09-20 RX ORDER — TRIAMCINOLONE ACETONIDE 1 MG/G
CREAM TOPICAL 2 TIMES DAILY
COMMUNITY
Start: 2023-12-23 | End: 2024-09-20 | Stop reason: SDUPTHER

## 2024-09-20 RX ORDER — TESTOSTERONE CYPIONATE 200 MG/ML
INJECTION, SOLUTION INTRAMUSCULAR
Qty: 6 ML | Refills: 3 | Status: SHIPPED | OUTPATIENT
Start: 2024-09-20

## 2024-09-20 RX ORDER — TRIAMCINOLONE ACETONIDE 1 MG/G
CREAM TOPICAL 2 TIMES DAILY
Refills: 2 | OUTPATIENT
Start: 2024-09-20

## 2024-09-20 RX ORDER — SEMAGLUTIDE 2.4 MG/.75ML
2.4 INJECTION, SOLUTION SUBCUTANEOUS WEEKLY
Qty: 3 ML | Refills: 2 | Status: SHIPPED | OUTPATIENT
Start: 2024-09-20 | End: 2024-09-20 | Stop reason: SDUPTHER

## 2024-09-20 ASSESSMENT — PAIN SCALES - GENERAL: PAINLEVEL: 0-NO PAIN

## 2024-09-20 ASSESSMENT — PATIENT HEALTH QUESTIONNAIRE - PHQ9
2. FEELING DOWN, DEPRESSED OR HOPELESS: NOT AT ALL
1. LITTLE INTEREST OR PLEASURE IN DOING THINGS: NOT AT ALL
SUM OF ALL RESPONSES TO PHQ9 QUESTIONS 1 AND 2: 0

## 2024-09-20 ASSESSMENT — COLUMBIA-SUICIDE SEVERITY RATING SCALE - C-SSRS
6. HAVE YOU EVER DONE ANYTHING, STARTED TO DO ANYTHING, OR PREPARED TO DO ANYTHING TO END YOUR LIFE?: NO
1. IN THE PAST MONTH, HAVE YOU WISHED YOU WERE DEAD OR WISHED YOU COULD GO TO SLEEP AND NOT WAKE UP?: NO
2. HAVE YOU ACTUALLY HAD ANY THOUGHTS OF KILLING YOURSELF?: NO

## 2024-09-20 ASSESSMENT — ENCOUNTER SYMPTOMS
DEPRESSION: 0
OCCASIONAL FEELINGS OF UNSTEADINESS: 0
LOSS OF SENSATION IN FEET: 0

## 2024-09-20 NOTE — PROGRESS NOTES
Subjective   Patient ID: Jose Franco is a 59 y.o. male who presents for Follow-up.  Patient presents to clinic for follow-up visit on history of  hyperlipidemia, hypogonadism, hypothyroidism, seasonal affective disorder, decreased libido, obesity, erectile dysfunction, hypertension,eczema,allergic rhinitis, coronary artery calcium score of zero in 2020 vitamin D deficiency, benign colon polyp, and ejection fraction of 65% per record done in 2020 .He is doing fairly well and has lost approximately 60 pounds on Wegovy since his last visit.  He denies any chest pain, cough congestion abdominal pain palpitation and diaphoresis.  Laboratory studies done shows serum glucose of 93 creatinine 0.85, normal TSH and other studies are unremarkable.    Review of Systems   Constitutional: Negative.    HENT: Negative.     Eyes: Negative.    Respiratory: Negative.     Cardiovascular: Negative.    Gastrointestinal: Negative.    Endocrine: Negative.    Genitourinary: Negative.    Musculoskeletal: Negative.    Skin: Negative.    Allergic/Immunologic: Negative.    Neurological: Negative.    Hematological: Negative.    Psychiatric/Behavioral: Negative.         Objective   /78 (BP Location: Right arm, Patient Position: Sitting)   Pulse 77   Ht 1.829 m (6')   Wt 126 kg (277 lb)   SpO2 96%   BMI 37.57 kg/m²     Physical Exam  Constitutional:       Appearance: Normal appearance. He is obese.   HENT:      Right Ear: Tympanic membrane normal.      Left Ear: Tympanic membrane and ear canal normal.      Nose: Nose normal.   Neck:      Vascular: No carotid bruit.   Cardiovascular:      Rate and Rhythm: Normal rate.   Pulmonary:      Effort: No respiratory distress.      Breath sounds: No stridor. No wheezing.   Abdominal:      Palpations: Abdomen is soft.      Tenderness: There is no abdominal tenderness. There is no guarding or rebound.   Skin:     Coloration: Skin is not jaundiced.      Findings: No bruising.   Neurological:       General: No focal deficit present.      Mental Status: He is alert and oriented to person, place, and time.   Psychiatric:         Mood and Affect: Mood normal.         Assessment/Plan   Assessment & Plan  Primary hypertension  Patient advised to continue lisinopril hydrochlorothiazide 1 tablet daily regarding hypertension he will be scheduled for repeat blood work.  He is advised to return to clinic in 6 months for follow-up visit.  Orders:    lisinopriL-hydrochlorothiazide 20-12.5 mg tablet; Take 1 tablet by mouth once daily.    CBC and Auto Differential; Future    Comprehensive metabolic panel; Future    Urinalysis with Reflex Microscopic; Future    Pre-diabetes  He is advised to follow ADA 2000-calorie diabetic diet and will be given influenza vaccine for health maintenance.  Will monitor hemoglobin A1c  Orders:    Hemoglobin A1c; Future    Flu vaccine, trivalent, preservative free, age 6 months and greater (Fluarix/Fluzone/Flulaval)    Other hyperlipidemia  Advised to follow low-cholesterol diet       Impotence  To to continue testosterone injection.    Orders:    testosterone cypionate (Depo-Testosterone) 200 mg/mL injection; Inject 200 mg/ml every 3 weeks    Acquired hypothyroidism  He will continue levothyroxine 175 mcg daily  Orders:    TSH; Future    Flu vaccine, trivalent, preservative free, age 6 months and greater (Fluarix/Fluzone/Flulaval)    Hypogonadism male  To continue testosterone injection       Benign essential hypertension  He is advised to decrease lisinopril hydrochlorothiazide to 1 tablet daily regarding history of hypertension and review of recent significant weight loss       Contact dermatitis, unspecified contact dermatitis type, unspecified trigger  He will continue triamcinolone ointment regarding dermatitis.  Orders:    triamcinolone (Kenalog) 0.1 % cream; Apply topically 2 times a day.    Obesity with serious comorbidity, unspecified classification, unspecified obesity type  He is  advised to follow Mediterranean diet, exercise and do lifestyle modification.  He will continue Wegovy regarding obesity.

## 2024-09-21 ASSESSMENT — ENCOUNTER SYMPTOMS
NEUROLOGICAL NEGATIVE: 1
EYES NEGATIVE: 1
MUSCULOSKELETAL NEGATIVE: 1
ALLERGIC/IMMUNOLOGIC NEGATIVE: 1
PSYCHIATRIC NEGATIVE: 1
HEMATOLOGIC/LYMPHATIC NEGATIVE: 1
GASTROINTESTINAL NEGATIVE: 1
CONSTITUTIONAL NEGATIVE: 1
ENDOCRINE NEGATIVE: 1
CARDIOVASCULAR NEGATIVE: 1
RESPIRATORY NEGATIVE: 1

## 2024-09-21 NOTE — ASSESSMENT & PLAN NOTE
He is advised to decrease lisinopril hydrochlorothiazide to 1 tablet daily regarding history of hypertension and review of recent significant weight loss

## 2024-09-21 NOTE — ASSESSMENT & PLAN NOTE
He will continue levothyroxine 175 mcg daily  Orders:    TSH; Future    Flu vaccine, trivalent, preservative free, age 6 months and greater (Fluarix/Fluzone/Flulaval)

## 2024-12-02 DIAGNOSIS — R73.03 PRE-DIABETES: ICD-10-CM

## 2024-12-02 DIAGNOSIS — E78.49 OTHER HYPERLIPIDEMIA: ICD-10-CM

## 2024-12-02 RX ORDER — SEMAGLUTIDE 2.4 MG/.75ML
INJECTION, SOLUTION SUBCUTANEOUS
Qty: 9 ML | Refills: 2 | Status: SHIPPED | OUTPATIENT
Start: 2024-12-02

## 2025-01-22 DIAGNOSIS — N52.9 IMPOTENCE: ICD-10-CM

## 2025-01-22 RX ORDER — TESTOSTERONE CYPIONATE 200 MG/ML
INJECTION, SOLUTION INTRAMUSCULAR
Qty: 6 ML | Refills: 3 | Status: SHIPPED | OUTPATIENT
Start: 2025-01-22

## 2025-02-10 DIAGNOSIS — R73.03 PRE-DIABETES: ICD-10-CM

## 2025-02-10 DIAGNOSIS — I10 BENIGN ESSENTIAL HYPERTENSION: ICD-10-CM

## 2025-02-10 DIAGNOSIS — E03.9 ACQUIRED HYPOTHYROIDISM: ICD-10-CM

## 2025-03-03 DIAGNOSIS — E78.49 OTHER HYPERLIPIDEMIA: ICD-10-CM

## 2025-03-03 DIAGNOSIS — R73.03 PRE-DIABETES: ICD-10-CM

## 2025-03-05 ENCOUNTER — TELEMEDICINE (OUTPATIENT)
Dept: PHARMACY | Facility: HOSPITAL | Age: 60
End: 2025-03-05
Payer: COMMERCIAL

## 2025-03-05 DIAGNOSIS — R73.03 PRE-DIABETES: ICD-10-CM

## 2025-03-05 DIAGNOSIS — E78.49 OTHER HYPERLIPIDEMIA: ICD-10-CM

## 2025-03-05 NOTE — PROGRESS NOTES
Pa is under [facility] Key: [id number]    Appeal/additional information for the PA sent 3/5/25.    Lowell San PharmD, BCACP  Clinical Pharmacy Specialist-Primary Care  [phone]

## 2025-03-05 NOTE — PROGRESS NOTES
"  Clinical Pharmacy Appointment    Patient ID: Yassine Franco \"Juan C" is a 59 y.o. male who presents for Hyperlipidemia, Hypertension, Obesity, and Prediabetes.    Pt is here for First appointment.   Referring Provider: Alan Sheets MD  PCP: Alan Sheets MD, last visit: 9/20/24, next visit: 4/18/25    Subjective   HPI  PMH significant for Obesity, Hyperlipidemia, HTN, Prediabetes.      Medication System Management  Patients preferred pharmacy:   [facility] - [address] [address]  Phone: [phone] Fax: [phone]  Adherence/Organization: No current concerns  Affordability/Accessibility:  Pa was denied for Wegovy, unclear why at this time-seems like additional information was needed for approval  Adverse Effects: No current concerns      Drug Interactions  No relevant drug interactions were noted.    WEIGHT LOSS  BMI Readings from Last 1 Encounters:   09/20/24 37.57 kg/m²   Starting weight: 338 lbs., BMI~45.84 (5/29/24)-prior to starting Wegovy  Previous weight: 277 lbs., BMI~37.57 (9/20/24)  Current weight: 250 lbs. Reported during today's visit. Down for 10-11 pant sizes    Lab Results   Component Value Date    HGBA1C 5.5 04/17/2024    GLUCOSE 93 08/29/2024       Lifestyle  Diet:   BK: yogurt  Snack: handful of almonds  LN: whole wheat rafael+peanut butter/jelly, garden of life meal replacement shake  DN: chicken with wrap and lettuce  Snacks: not an evening snacker  Drinks: coffee (splash of skim milk), water  Has worked with nutritionist/dietician? No  Physical Activity: Every other day, he has a gym set up in his garage. Then in the summer he will do a lot of walking. He is doing aerobic activity videos on YouTube on the off days. Stretches every morning. Way more active in summer    Non-Pharmacological Therapy  Weight loss techniques attempted:  Self-directed dieting: calorie counting, activity etc    Other Potential " "Contributing Factors  Alcohol use: Average 0 drinks/week, quite 20 years ago  Medications that may cause weight gain:  testosterone sometimes can have a very minimal weight gain  Mental health: No current concerns  Eating Disorders: Denies Hx of any eating disorder  Comorbidities: diabetes mellitus, dyslipidemias, and hypertension  Hx of DARION: no  Hx of fatty liver disease: no  Hx of cardiovascular disease (MI/stroke/CVA): no  Hx of osteoarthritis or arthritis in general: yes, he reports arthritis in his right hip. He had a hip resurfacing in 2008 on the right hip.  Diagnosed HTN: yes  Diagnosed HLD: yes  Blood sugar concerns: prediabetes listed but hemoglobin A1c=5.5% and average glucose has been appropriate    Pharmacological Therapy  Current Medications:   Wegovy (6/2/24-current)  Previous Medications: none     Insurance coverage of weight-loss medications? Yes, previously was receiving Wegovy through his insurance  Eligible for copay cards/programs? Yes  Eligible for  PAP? N/A    Medication Estimated Cost Expected weight loss Patient Risks/Cautions Notes   Wegovy (semaglutide) $650/month   w/ savings card 10-20% None      Zepbound (tirzepatide) $650/month   w/ savings card.  Vials available at lower cost via [employer] 10-20%     Qsymia (phentermine-topiramate) $98/month via [employer]sym[employer] 8-10% None Controlled substance   Contrave (bupropion-naltrexone) $99/month   via [employer] 5-10% None    Adipex (phentermine) ~$15/month 3-5% None Controlled substance,  Short-term use only   Kyle (OTC Orlistat) ~$60/month 3-5%  Adverse effects likely outweigh benefit.      Preventative Care  Immunizations Needed: Shingrix and Tdap  Tobacco Use: former smoker, quit unknown     Objective   No Known Allergies  Social History     Social History Narrative    Not on file      Medication Review  Current Outpatient Medications   Medication Instructions    BD Luer-Mariama Syringe 3 mL 18 x 1 1/2\" syringe 1 Needle, abdominal " "subcutaneous, Daily    levothyroxine (SYNTHROID, LEVOXYL) 175 mcg, oral, Daily, as directed    lisinopriL-hydrochlorothiazide 20-12.5 mg tablet 1 tablet, oral, Daily    semaglutide, weight loss, (Wegovy) 2.4 mg/0.75 mL pen injector INJECT 2.4 MG UNDER THE SKIN WEEKLY    sildenafil (VIAGRA) 100 mg, oral, Daily PRN    testosterone cypionate (Depo-Testosterone) 200 mg/mL injection Inject 200 mg/ml every 3 weeks    triamcinolone (Kenalog) 0.1 % cream Topical, 2 times daily      Vitals  BP Readings from Last 2 Encounters:   09/20/24 117/78   05/29/24 100/63     BMI Readings from Last 1 Encounters:   09/20/24 37.57 kg/m²      Labs  A1C  Lab Results   Component Value Date    HGBA1C 5.5 04/17/2024    HGBA1C 5.5 02/02/2023    HGBA1C 5.8 (A) 02/02/2022     BMP  Lab Results   Component Value Date    CALCIUM 9.9 08/29/2024     08/29/2024    K 4.3 08/29/2024    CO2 25 08/29/2024     08/29/2024    BUN 15 08/29/2024    CREATININE 0.85 08/29/2024    EGFR >90 08/29/2024     LFTs  Lab Results   Component Value Date    ALT 34 04/17/2024    AST 23 04/17/2024    ALKPHOS 47 04/17/2024    BILITOT 0.7 04/17/2024     FLP  Lab Results   Component Value Date    TRIG 116 04/17/2024    CHOL 195 04/17/2024    LDLF 117 (H) 02/02/2023    LDLCALC 129 (H) 04/17/2024    HDL 43.2 04/17/2024     Urine Microalbumin  No results found for: \"MICROALBCREA\"  Weight Management  Wt Readings from Last 3 Encounters:   09/20/24 126 kg (277 lb)   05/29/24 (!) 153 kg (338 lb)   10/10/23 150 kg (330 lb)      There is no height or weight on file to calculate BMI.    Assessment/Plan   Problem List Items Addressed This Visit          Cardiac and Vasculature    Hyperlipidemia     Other Visit Diagnoses       Pre-diabetes                Patient's current weight reported as 250lbs. Has lost ~90lbs. (30% of TBW) since starting therapy plan.  Goal weight 220lbs  Current regimen includes: Wegovy 2.4mg  Will:  Attempt to get Wegovy 2.4mg weekly appealed decision " for the patient, as he has been on this for almost a full year and is doing well. Seems like insurance just needs a better baseline BMI and more information    Patient Education:  Counseled patient on relevant medication mechanisms of action, expectations, side effects, duration of therapy, contraindications, administration, and monitoring parameters.  All questions and concerns addressed. Contact pharmacist with any further questions or concerns prior to next appointment.    Clinical Pharmacist follow-up: 12 weeks, Telehealth visit  Patient is not followed in [facility].    Thank you,  Lowell San PharmD, [facility]  Clinical Pharmacy Specialist-Primary Care  [phone]    Continue all meds under the continuation of care with the referring provider and clinical pharmacy team.  Verbal consent to manage patient's drug therapy was obtained from the patient. They were informed they may decline to participate or withdraw from participation in pharmacy services at any time.

## 2025-03-06 ENCOUNTER — TELEMEDICINE (OUTPATIENT)
Dept: PHARMACY | Facility: HOSPITAL | Age: 60
End: 2025-03-06
Payer: COMMERCIAL

## 2025-03-06 DIAGNOSIS — R73.03 PREDIABETES: ICD-10-CM

## 2025-03-06 DIAGNOSIS — I10 BENIGN ESSENTIAL HYPERTENSION: ICD-10-CM

## 2025-03-06 DIAGNOSIS — E66.9 OBESITY, UNSPECIFIED CLASS, UNSPECIFIED OBESITY TYPE, UNSPECIFIED WHETHER SERIOUS COMORBIDITY PRESENT: Primary | ICD-10-CM

## 2025-03-06 DIAGNOSIS — E78.49 OTHER HYPERLIPIDEMIA: ICD-10-CM

## 2025-04-06 LAB
ALBUMIN SERPL-MCNC: 4.4 G/DL (ref 3.6–5.1)
ALP SERPL-CCNC: 47 U/L (ref 35–144)
ALT SERPL-CCNC: 17 U/L (ref 9–46)
ANION GAP SERPL CALCULATED.4IONS-SCNC: 9 MMOL/L (CALC) (ref 7–17)
APPEARANCE UR: CLEAR
AST SERPL-CCNC: 17 U/L (ref 10–35)
BASOPHILS # BLD AUTO: 74 CELLS/UL (ref 0–200)
BASOPHILS NFR BLD AUTO: 0.4 %
BILIRUB SERPL-MCNC: 0.8 MG/DL (ref 0.2–1.2)
BILIRUB UR QL STRIP: NEGATIVE
BUN SERPL-MCNC: 19 MG/DL (ref 7–25)
CALCIUM SERPL-MCNC: 9.6 MG/DL (ref 8.6–10.3)
CHLORIDE SERPL-SCNC: 102 MMOL/L (ref 98–110)
CO2 SERPL-SCNC: 27 MMOL/L (ref 20–32)
COLOR UR: YELLOW
CREAT SERPL-MCNC: 0.77 MG/DL (ref 0.7–1.3)
EGFRCR SERPLBLD CKD-EPI 2021: 103 ML/MIN/1.73M2
EOSINOPHIL # BLD AUTO: 74 CELLS/UL (ref 15–500)
EOSINOPHIL NFR BLD AUTO: 0.4 %
ERYTHROCYTE [DISTWIDTH] IN BLOOD BY AUTOMATED COUNT: 12.2 % (ref 11–15)
EST. AVERAGE GLUCOSE BLD GHB EST-MCNC: 105 MG/DL
EST. AVERAGE GLUCOSE BLD GHB EST-SCNC: 5.8 MMOL/L
GLUCOSE SERPL-MCNC: 83 MG/DL (ref 65–99)
GLUCOSE UR QL STRIP: NEGATIVE
HBA1C MFR BLD: 5.3 % OF TOTAL HGB
HCT VFR BLD AUTO: 46.8 % (ref 38.5–50)
HGB BLD-MCNC: 15.8 G/DL (ref 13.2–17.1)
HGB UR QL STRIP: NEGATIVE
KETONES UR QL STRIP: NEGATIVE
LEUKOCYTE ESTERASE UR QL STRIP: NEGATIVE
LYMPHOCYTES # BLD AUTO: 2313 CELLS/UL (ref 850–3900)
LYMPHOCYTES NFR BLD AUTO: 12.5 %
MCH RBC QN AUTO: 31.8 PG (ref 27–33)
MCHC RBC AUTO-ENTMCNC: 33.8 G/DL (ref 32–36)
MCV RBC AUTO: 94.2 FL (ref 80–100)
MONOCYTES # BLD AUTO: 870 CELLS/UL (ref 200–950)
MONOCYTES NFR BLD AUTO: 4.7 %
NEUTROPHILS # BLD AUTO: ABNORMAL CELLS/UL (ref 1500–7800)
NEUTROPHILS NFR BLD AUTO: 82 %
NITRITE UR QL STRIP: NEGATIVE
PH UR STRIP: 6.5 [PH] (ref 5–8)
PLATELET # BLD AUTO: 357 THOUSAND/UL (ref 140–400)
PMV BLD REES-ECKER: 9.5 FL (ref 7.5–12.5)
POTASSIUM SERPL-SCNC: 4.4 MMOL/L (ref 3.5–5.3)
PROT SERPL-MCNC: 6.4 G/DL (ref 6.1–8.1)
PROT UR QL STRIP: NEGATIVE
RBC # BLD AUTO: 4.97 MILLION/UL (ref 4.2–5.8)
SODIUM SERPL-SCNC: 138 MMOL/L (ref 135–146)
SP GR UR STRIP: 1.02 (ref 1–1.03)
TSH SERPL-ACNC: 0.44 MIU/L (ref 0.4–4.5)
WBC # BLD AUTO: 18.5 THOUSAND/UL (ref 3.8–10.8)

## 2025-04-11 ASSESSMENT — PROMIS GLOBAL HEALTH SCALE
RATE_MENTAL_HEALTH: VERY GOOD
EMOTIONAL_PROBLEMS: SOMETIMES
CARRYOUT_SOCIAL_ACTIVITIES: EXCELLENT
RATE_QUALITY_OF_LIFE: VERY GOOD
CARRYOUT_PHYSICAL_ACTIVITIES: COMPLETELY
RATE_SOCIAL_SATISFACTION: VERY GOOD
RATE_PHYSICAL_HEALTH: VERY GOOD
RATE_AVERAGE_PAIN: 1
RATE_AVERAGE_FATIGUE: MILD
RATE_GENERAL_HEALTH: GOOD

## 2025-04-18 ENCOUNTER — APPOINTMENT (OUTPATIENT)
Dept: PRIMARY CARE | Facility: CLINIC | Age: 60
End: 2025-04-18
Payer: COMMERCIAL

## 2025-04-18 VITALS
WEIGHT: 246 LBS | HEIGHT: 72 IN | SYSTOLIC BLOOD PRESSURE: 120 MMHG | BODY MASS INDEX: 33.32 KG/M2 | HEART RATE: 90 BPM | OXYGEN SATURATION: 97 % | DIASTOLIC BLOOD PRESSURE: 80 MMHG

## 2025-04-18 DIAGNOSIS — E66.812 CLASS 2 SEVERE OBESITY DUE TO EXCESS CALORIES WITH SERIOUS COMORBIDITY AND BODY MASS INDEX (BMI) OF 35.0 TO 35.9 IN ADULT: ICD-10-CM

## 2025-04-18 DIAGNOSIS — E03.9 ACQUIRED HYPOTHYROIDISM: ICD-10-CM

## 2025-04-18 DIAGNOSIS — Z23 ENCOUNTER FOR IMMUNIZATION: ICD-10-CM

## 2025-04-18 DIAGNOSIS — D72.829 LEUKOCYTOSIS, UNSPECIFIED TYPE: ICD-10-CM

## 2025-04-18 DIAGNOSIS — E78.49 OTHER HYPERLIPIDEMIA: ICD-10-CM

## 2025-04-18 DIAGNOSIS — E66.01 CLASS 2 SEVERE OBESITY DUE TO EXCESS CALORIES WITH SERIOUS COMORBIDITY AND BODY MASS INDEX (BMI) OF 35.0 TO 35.9 IN ADULT: ICD-10-CM

## 2025-04-18 DIAGNOSIS — L25.9 CONTACT DERMATITIS, UNSPECIFIED CONTACT DERMATITIS TYPE, UNSPECIFIED TRIGGER: ICD-10-CM

## 2025-04-18 DIAGNOSIS — E29.1 HYPOGONADISM MALE: ICD-10-CM

## 2025-04-18 DIAGNOSIS — K63.5 POLYP OF COLON, UNSPECIFIED PART OF COLON, UNSPECIFIED TYPE: ICD-10-CM

## 2025-04-18 DIAGNOSIS — E03.9 HYPOTHYROIDISM (ACQUIRED): ICD-10-CM

## 2025-04-18 DIAGNOSIS — I10 BENIGN ESSENTIAL HYPERTENSION: Primary | ICD-10-CM

## 2025-04-18 DIAGNOSIS — Z00.00 ROUTINE GENERAL MEDICAL EXAMINATION AT A HEALTH CARE FACILITY: ICD-10-CM

## 2025-04-18 RX ORDER — TRIAMCINOLONE ACETONIDE 1 MG/G
CREAM TOPICAL 2 TIMES DAILY
Qty: 80 G | Refills: 1 | Status: SHIPPED | OUTPATIENT
Start: 2025-04-18

## 2025-04-18 RX ORDER — LEVOTHYROXINE SODIUM 175 UG/1
175 TABLET ORAL DAILY
Qty: 90 TABLET | Refills: 3 | Status: SHIPPED | OUTPATIENT
Start: 2025-04-18

## 2025-04-18 RX ORDER — FLUTICASONE PROPIONATE 50 MCG
SPRAY, SUSPENSION (ML) NASAL
COMMUNITY

## 2025-04-18 ASSESSMENT — ENCOUNTER SYMPTOMS
OCCASIONAL FEELINGS OF UNSTEADINESS: 0
LOSS OF SENSATION IN FEET: 0
DEPRESSION: 0

## 2025-04-18 NOTE — PROGRESS NOTES
Subjective   Patient ID: Jose Franco is a 59 y.o. male who presents for Annual Exam.    HPI patient presents to clinic for annual physical examination and follow-up visit on history of  hyperlipidemia, hypogonadism, hypothyroidism, seasonal affective disorder, decreased libido, obesity, erectile dysfunction, hypertension,eczema,allergic rhinitis, coronary artery calcium score of zero in 2020 vitamin D deficiency   , benign colon polyp, and ejection fraction of 65%.  He is doing well and has lost another 25 pounds since his last visit.  He denies any cough, congestion, abdominal pain GI bleeding and palpitation.  Laboratory studies done shows hemoglobin of 15.8, WBC of 18.5, absolute neutrophil of 15,170, glucose of 83 and other studies are unremarkable. EKG done shows sinus rhythm at 71 bpm with normal axis normal interval without any ischemic changes.      Review of Systems   Constitutional: Negative.    HENT: Negative.     Eyes: Negative.    Respiratory: Negative.     Cardiovascular: Negative.    Gastrointestinal: Negative.    Endocrine: Negative.    Genitourinary: Negative.    Musculoskeletal: Negative.    Skin: Negative.    Allergic/Immunologic: Negative.    Neurological: Negative.    Hematological: Negative.    Psychiatric/Behavioral: Negative.         Objective     OARRS:  No data recorded  I have personally reviewed the OARRS report for Yassine Franco. I have considered the risks of abuse, dependence, addiction and diversion    Is the patient prescribed a combination of a benzodiazepine and opioid?  Yes, I feel it is clincially indicated to continue the medication and have discussed with the patient risks/benefits/alternatives.    Last Urine Drug Screen / ordered today: Yes  Recent Results (from the past 8760 hours)   Confirmation Opiate/Opioid/Benzo Prescription Compliance    Collection Time: 08/29/24 10:34 AM   Result Value Ref Range    Clonazepam <25 <25 ng/mL    7-Aminoclonazepam <25 <25 ng/mL     Alprazolam <25 <25 ng/mL    Alpha-Hydroxyalprazolam <25 <25 ng/mL    Midazolam <25 <25 ng/mL    Alpha-Hydroxymidazolam <25 <25 ng/mL    Chlordiazepoxide <25 <25 ng/mL    Diazepam <25 <25 ng/mL    Nordiazepam <25 <25 ng/mL    Temazepam <25 <25 ng/mL    Oxazepam <25 <25 ng/mL    Lorazepam <25 <25 ng/mL    Methadone <25 <25 ng/mL    EDDP <25 <25 ng/mL    6-Acetylmorphine <25 <25 ng/mL    Codeine <50 <50 ng/mL    Hydrocodone <25 <25 ng/mL    Hydromorphone <25 <25 ng/mL    Morphine  <50 <50 ng/mL    Norhydrocodone <25 <25 ng/mL    Noroxycodone <25 <25 ng/mL    Oxycodone <25 <25 ng/mL    Oxymorphone <25 <25 ng/mL    Fentanyl <2.5 <2.5 ng/mL    Norfentanyl <2.5 <2.5 ng/mL    Tramadol <50 <50 ng/mL    O-Desmethyltramadol <50 <50 ng/mL    Zolpidem <25 <25 ng/mL    Zolpidem Metabolite (ZCA) <25 <25 ng/mL   Screen Opiate/Opioid/Benzo Prescription Compliance    Collection Time: 08/29/24 10:34 AM   Result Value Ref Range    Creatinine, Urine Random 76.3 20.0 - 370.0 mg/dL    Amphetamine Screen, Urine Presumptive Negative Presumptive Negative    Barbiturate Screen, Urine Presumptive Negative Presumptive Negative    Cannabinoid Screen, Urine Presumptive Negative Presumptive Negative    Cocaine Metabolite Screen, Urine Presumptive Negative Presumptive Negative    PCP Screen, Urine Presumptive Negative Presumptive Negative     Results are as expected.         Controlled Substance Agreement:  Date of the Last Agreement: 5/29/24  Reviewed Controlled Substance Agreement including but not limited to the benefits, risks, and alternatives to treatment with a Controlled Substance medication(s).    Testosterone:  What is the patient's goal of therapy? To help with ed  Is this being achieved with current treatment? yes    I attest the patient does not have: Breast Cancer    Last Testosterone check:  Testosterone, Free   Date Value Ref Range Status   04/17/2024 127.3 35.0 - 155.0 pg/mL Final     Comment:        This test was developed and its  "analytical performance  characteristics have been determined by [facility], [place]. It has  not been cleared or approved by the U.S. Food and Drug  Administration. This assay has been validated pursuant  to the CLIA regulations and is used for clinical  purposes.            Testosterone, Total, LC-MS/MS   Date Value Ref Range Status   04/17/2024 486 250 - 1100 ng/dL Final     Comment:        For additional information, please refer to  http://education.Qteros/faq/  [id number]  (This link is being provided for informational/  educational purposes only.)     This test was developed and its analytical performance  characteristics have been determined by [place], [place]. It has  not been cleared or approved by the U.S. Food and Drug  Administration. This assay has been validated pursuant  to the CLIA regulations and is used for clinical  purposes.              Last CBC:    No results found for: \"CBCDIF\", \"BMCBC\", \"PR1\"    Last PSA:   PSA   Date Value Ref Range Status   05/06/2020 2.81 0.00 - 4.00 ng/mL Final     Comment:     The FDA requires that the method used for PSA assay be   reported to the physician. Values obtained with different   assay methods must not be used interchangeably. This test   was performed at [facility] using the Siemens  [employer] PSA method, which is a sandwich immunoassay using   chemiluminescence for quantitation. The assay is approved  for measurement of prostate-specific antigen (PSA) in   serum and may be used in conjunction with a digital rectal  examination in men 50 years and older as an aid in   detection of prostate cancer.   5-Alpha-reductase inhibitors (e.g. Proscar, Finasteride,   Avodart, Dutasteride and Laura) for the treatment of BPH   have been shown to lower PSA levels by an average of 50%   after 6 months of treatment.       Prostate Specific Antigen,Screen "   Date Value Ref Range Status   04/17/2024 3.32 <=4.00 ng/mL Final       Activities of Daily Living:   Is your overall impression that this patient is benefiting (symptom reduction outweighs side effects) from testosterone therapy? Yes     1. Physical Functioning: Better  2. Family Relationship: Better  3. Social Relationship: Better  4. Mood: Better  5. Sleep Patterns: Better  6. Overall Function: Better      /80 (BP Location: Right arm, Patient Position: Sitting)   Pulse 90   Ht 1.829 m (6')   Wt 112 kg (246 lb)   SpO2 97%   BMI 33.36 kg/m²     Physical Exam  Constitutional:       Appearance: Normal appearance. He is obese.   HENT:      Right Ear: Tympanic membrane normal.      Left Ear: Tympanic membrane and ear canal normal.      Nose: Nose normal.   Neck:      Vascular: No carotid bruit.   Cardiovascular:      Rate and Rhythm: Normal rate.   Pulmonary:      Effort: No respiratory distress.      Breath sounds: No stridor. No wheezing.   Abdominal:      Palpations: Abdomen is soft.      Tenderness: There is no abdominal tenderness. There is no guarding or rebound.   Skin:     Coloration: Skin is not jaundiced.      Findings: No bruising.   Neurological:      General: No focal deficit present.      Mental Status: He is alert and oriented to person, place, and time.   Psychiatric:         Mood and Affect: Mood normal.         Assessment/Plan   Assessment & Plan  Routine general medical examination at a health care facility  Patient will be scheduled for routine blood work and will check PSA for prostate cancer screening.  He will return to clinic in 6 months for follow-up visit.  Orders:    Prostate Spec.Ag,Screen; Future    Contact dermatitis, unspecified contact dermatitis type, unspecified trigger  He is advised to continue Kenalog regarding contact dermatitis.     Orders:    triamcinolone (Kenalog) 0.1 % cream; Apply topically 2 times a day.    Benign essential hypertension  He will continue  lisinopril hydrochlorothiazide regarding history of hypertension and will monitor  Orders:    ECG 12 lead (Clinic Performed)    Basic metabolic panel; Future    Other hyperlipidemia  He is advised to follow low-cholesterol diet.       Acquired hypothyroidism  He will continue levothyroxine and will monitor TSH.  Orders:    TSH; Future    Hypogonadism male  He will continue testosterone therapy regarding hypogonadism and will monitor testosterone level.  Orders:    Testosterone, total and free; Future    Leukocytosis, unspecified type  He will be referred to hematology regarding leukocytosis and elevated neutrophil count.  Orders:    Referral To Hematology and Oncology; Future    CBC and Auto Differential; Future    Class 2 severe obesity due to excess calories with serious comorbidity and body mass index (BMI) of 35.0 to 35.9 in adult  He will continue Wegovy regarding obesity and will monitor BMI.       Polyp of colon, unspecified part of colon, unspecified type  Will schedule repeat colonoscopy Orders:    Colonoscopy Screening; Average Risk Patient; Future    Encounter for immunization  He will be given Tdap vaccination for health maintenance and is also given prescription for Shingrix.  Orders:    Tdap vaccine, age 7 years and older (ADACEL)    zoster vaccine-recombinant adjuvanted (Shingrix) 50 mcg/0.5 mL vaccine; Inject 0.5 mL into the muscle 1 time for 1 dose.    Hypothyroidism (acquired)  He will continue levothyroxine and will return to clinic in 6 months for follow-up visit.  Orders:    levothyroxine (Synthroid, Levoxyl) 175 mcg tablet; Take 1 tablet (175 mcg) by mouth once daily. as directed

## 2025-04-19 ASSESSMENT — ENCOUNTER SYMPTOMS
CARDIOVASCULAR NEGATIVE: 1
NEUROLOGICAL NEGATIVE: 1
ENDOCRINE NEGATIVE: 1
EYES NEGATIVE: 1
MUSCULOSKELETAL NEGATIVE: 1
CONSTITUTIONAL NEGATIVE: 1
PSYCHIATRIC NEGATIVE: 1
HEMATOLOGIC/LYMPHATIC NEGATIVE: 1
ALLERGIC/IMMUNOLOGIC NEGATIVE: 1
GASTROINTESTINAL NEGATIVE: 1
RESPIRATORY NEGATIVE: 1

## 2025-04-19 NOTE — ASSESSMENT & PLAN NOTE
He will continue lisinopril hydrochlorothiazide regarding history of hypertension and will monitor  Orders:    ECG 12 lead (Clinic Performed)    Basic metabolic panel; Future

## 2025-04-19 NOTE — ASSESSMENT & PLAN NOTE
He will continue testosterone therapy regarding hypogonadism and will monitor testosterone level.  Orders:    Testosterone, total and free; Future

## 2025-06-07 ENCOUNTER — ANCILLARY PROCEDURE (OUTPATIENT)
Dept: URGENT CARE | Age: 60
End: 2025-06-07
Payer: COMMERCIAL

## 2025-06-07 ENCOUNTER — OFFICE VISIT (OUTPATIENT)
Dept: URGENT CARE | Age: 60
End: 2025-06-07
Payer: COMMERCIAL

## 2025-06-07 VITALS
RESPIRATION RATE: 20 BRPM | HEART RATE: 80 BPM | HEIGHT: 72 IN | BODY MASS INDEX: 31.83 KG/M2 | OXYGEN SATURATION: 100 % | WEIGHT: 235 LBS | TEMPERATURE: 98.4 F | SYSTOLIC BLOOD PRESSURE: 107 MMHG | DIASTOLIC BLOOD PRESSURE: 61 MMHG

## 2025-06-07 DIAGNOSIS — M25.562 ACUTE PAIN OF LEFT KNEE: ICD-10-CM

## 2025-06-07 DIAGNOSIS — M25.562 ACUTE PAIN OF LEFT KNEE: Primary | ICD-10-CM

## 2025-06-07 DIAGNOSIS — S83.92XA SPRAIN OF LEFT KNEE, UNSPECIFIED LIGAMENT, INITIAL ENCOUNTER: ICD-10-CM

## 2025-06-07 PROCEDURE — 99203 OFFICE O/P NEW LOW 30 MIN: CPT | Performed by: NURSE PRACTITIONER

## 2025-06-07 PROCEDURE — 73564 X-RAY EXAM KNEE 4 OR MORE: CPT | Mod: LEFT SIDE | Performed by: NURSE PRACTITIONER

## 2025-06-07 PROCEDURE — 3008F BODY MASS INDEX DOCD: CPT | Performed by: NURSE PRACTITIONER

## 2025-06-07 PROCEDURE — 1036F TOBACCO NON-USER: CPT | Performed by: NURSE PRACTITIONER

## 2025-06-07 PROCEDURE — 3074F SYST BP LT 130 MM HG: CPT | Performed by: NURSE PRACTITIONER

## 2025-06-07 PROCEDURE — 3078F DIAST BP <80 MM HG: CPT | Performed by: NURSE PRACTITIONER

## 2025-06-07 RX ORDER — METHYLPREDNISOLONE 4 MG/1
TABLET ORAL
Qty: 21 TABLET | Refills: 0 | Status: SHIPPED | OUTPATIENT
Start: 2025-06-07 | End: 2025-06-13

## 2025-06-07 ASSESSMENT — ENCOUNTER SYMPTOMS: ARTHRALGIAS: 1

## 2025-06-07 NOTE — PROGRESS NOTES
"Subjective   Patient ID: Yassine Franco \"Jose\" is a 59 y.o. male. They present today with a chief complaint of Knee Pain (Left knee swollen, hard to stretch x 1 week.).    History of Present Illness  Deneis injury  Knee pain for 1 week  Left knee          Past Medical History  Allergies as of 06/07/2025    (No Known Allergies)       Prescriptions Prior to Admission[1]     Medical History[2]    Surgical History[3]     reports that he has quit smoking. His smoking use included cigarettes. He has a 30 pack-year smoking history. He has never used smokeless tobacco. He reports that he does not currently use alcohol. He reports that he does not currently use drugs.    Review of Systems  Review of Systems   Musculoskeletal:  Positive for arthralgias.   All other systems reviewed and are negative.                                 Objective    Vitals:    06/07/25 0808   BP: 107/61   Pulse: 80   Resp: 20   Temp: 36.9 °C (98.4 °F)   TempSrc: Oral   SpO2: 100%   Weight: 107 kg (235 lb)   Height: 1.829 m (6')     No LMP for male patient.    Physical Exam  Vitals reviewed.   Constitutional:       Appearance: Normal appearance.   Pulmonary:      Effort: Pulmonary effort is normal.   Musculoskeletal:      Left knee: Swelling present. No erythema. Decreased range of motion. Tenderness present over the medial joint line.        Legs:    Neurological:      Mental Status: He is alert.         Procedures    Point of Care Test & Imaging Results from this visit  No results found for this visit on 06/07/25.   Imaging  No results found.    Cardiology, Vascular, and Other Imaging  No other imaging results found for the past 2 days      Diagnostic study results (if any) were reviewed by NEELA Harry.    Assessment/Plan   Allergies, medications, history, and pertinent labs/EKGs/Imaging reviewed by NEELA Harry.     Medical Decision Making  Reviewed the XR with the pt and discussed immobilization and crutches " until he is able to follo wup with ortho.  Referral made and discussed walk in clinics  Medrol today for inflammation and discomfort  Pt has crutches at home    Orders and Diagnoses  Diagnoses and all orders for this visit:  Acute pain of left knee  -     XR knee left 4+ views; Future    Encounter Diagnoses   Name Primary?    Acute pain of left knee Yes    Sprain of left knee, unspecified ligament, initial encounter        Medical Admin Record      Patient disposition: Home    Electronically signed by NEELA Harry  8:38 AM           [1] (Not in a hospital admission)  [2]   Past Medical History:  Diagnosis Date    Allergic     Arthritis     Body mass index (BMI) 45.0-49.9, adult (Multi) 03/15/2021    BMI 45.0-49.9, adult    Disease of thyroid gland     Eczema     Hyperlipidemia, unspecified 08/03/2022    Hyperlipidemia    Hypertension     Hypothyroidism, unspecified 08/03/2022    Hypothyroidism    Personal history of other mental and behavioral disorders 08/12/2019    History of depression   [3]   Past Surgical History:  Procedure Laterality Date    ANKLE ARTHROSCOPY W/ OPEN REPAIR  11/08/2017    Ankle Repair    JOINT REPLACEMENT      OTHER SURGICAL HISTORY  09/25/2020    Complete colonoscopy    TOTAL HIP ARTHROPLASTY  06/26/2013    Total Hip Replacement    WISDOM TOOTH EXTRACTION

## 2025-06-09 ENCOUNTER — APPOINTMENT (OUTPATIENT)
Dept: ORTHOPEDIC SURGERY | Facility: CLINIC | Age: 60
End: 2025-06-09
Payer: COMMERCIAL

## 2025-06-09 DIAGNOSIS — M22.42 CHONDROMALACIA OF LEFT PATELLA: Primary | ICD-10-CM

## 2025-06-09 DIAGNOSIS — S83.92XA SPRAIN OF LEFT KNEE, UNSPECIFIED LIGAMENT, INITIAL ENCOUNTER: ICD-10-CM

## 2025-06-09 DIAGNOSIS — M25.562 ACUTE PAIN OF LEFT KNEE: ICD-10-CM

## 2025-06-09 PROCEDURE — 99203 OFFICE O/P NEW LOW 30 MIN: CPT

## 2025-06-09 PROCEDURE — 99202 OFFICE O/P NEW SF 15 MIN: CPT

## 2025-06-09 RX ORDER — MELOXICAM 15 MG/1
15 TABLET ORAL DAILY
Qty: 15 TABLET | Refills: 0 | Status: SHIPPED | OUTPATIENT
Start: 2025-06-09 | End: 2025-06-24

## 2025-06-10 ENCOUNTER — APPOINTMENT (OUTPATIENT)
Dept: PHARMACY | Facility: HOSPITAL | Age: 60
End: 2025-06-10
Payer: COMMERCIAL

## 2025-06-10 DIAGNOSIS — E78.49 OTHER HYPERLIPIDEMIA: ICD-10-CM

## 2025-06-10 DIAGNOSIS — R73.03 PRE-DIABETES: ICD-10-CM

## 2025-06-10 DIAGNOSIS — I10 BENIGN ESSENTIAL HYPERTENSION: ICD-10-CM

## 2025-06-10 DIAGNOSIS — R73.03 PREDIABETES: ICD-10-CM

## 2025-06-10 DIAGNOSIS — E66.9 OBESITY, UNSPECIFIED CLASS, UNSPECIFIED OBESITY TYPE, UNSPECIFIED WHETHER SERIOUS COMORBIDITY PRESENT: Primary | ICD-10-CM

## 2025-06-10 RX ORDER — SEMAGLUTIDE 2.4 MG/.75ML
2.4 INJECTION, SOLUTION SUBCUTANEOUS WEEKLY
Qty: 9 ML | Refills: 3 | Status: SHIPPED | OUTPATIENT
Start: 2025-06-10 | End: 2026-06-10

## 2025-06-10 NOTE — PROGRESS NOTES
"  Clinical Pharmacy Appointment    Patient ID: Yassine Franco \"Juan C" is a 59 y.o. male who presents for Obesity, Hyperlipidemia, Hypertension, Medication Visit, and Prediabetes.    Pt is here for Follow Up appointment.   Referring Provider: Alan Sheets MD  PCP: No Assigned PCP Generic Provider, MD, last visit: 9/20/24, next visit: 4/18/25    Subjective   HPI  PMH significant for Obesity, Hyperlipidemia, HTN, Prediabetes.    Medication System Management  Patients preferred pharmacy:   [facility] - [facility] [address]  [address] [address]  Phone: [phone] Fax: [phone]    [id number] - [place], [place] - [address]  [address]  [place] [facility] [address]  Phone: [phone] Fax: [phone]  Adherence/Organization: No current concerns  Affordability/Accessibility: No current concerns  Adverse Effects: No current concerns    Drug Interactions  No relevant drug interactions were noted.    WEIGHT LOSS  BMI Readings from Last 1 Encounters:   06/07/25 31.87 kg/m²   Starting weight: 338 lbs., BMI~45.84 (5/29/24)-prior to starting Wegovy  Previous weight: 277 lbs., BMI~37.57 (9/20/24)  250 lbs. Reported during today's visit. Down for 10-11 pant sizes  Patient 235lbs on 6/7/25 in office, BMI~31    Lab Results   Component Value Date    HGBA1C 5.3 04/05/2025    GLUCOSE 83 04/05/2025       Lifestyle  Diet: Changes since last time: no major changes to diet  Has worked with nutritionist/dietician? No  Physical Activity: He hasn't kicked up activity quite yet. He just finished up his school year. He is having some knee issues currently. So, more limited, but still getting his activity in per report    Non-Pharmacological Therapy  Weight loss techniques attempted:  Self-directed dieting: calorie counting, activity etc    Other Potential Contributing Factors  Alcohol use: Average 0 drinks/week, quite 20 years ago  Medications that " "may cause weight gain: testosterone sometimes can have a very minimal weight gain  Mental health: No current concerns  Eating Disorders: Denies Hx of any eating disorder  Comorbidities: diabetes mellitus, dyslipidemias, and hypertension  Hx of DARION: no  Hx of fatty liver disease: no  Hx of cardiovascular disease (MI/stroke/CVA): no  Hx of osteoarthritis or arthritis in general: yes, he reports arthritis in his right hip. He had a hip resurfacing in 2008 on the right hip.  Diagnosed HTN: yes  Diagnosed HLD: yes  Blood sugar concerns: prediabetes listed but hemoglobin A1c=5.5% and average glucose has been appropriate    Pharmacological Therapy  Current Medications:   Wegovy (6/2/24-current)  Previous Medications: none     Insurance coverage of weight-loss medications? Yes, previously was receiving Wegovy through his insurance  Eligible for copay cards/programs? Yes  Eligible for  PAP? N/A    Preventative Care  Immunizations Needed: Shingrix and Tdap  Tobacco Use: former smoker, quit unknown     Objective   No Known Allergies  Social History     Social History Narrative    Not on file      Medication Review  Current Outpatient Medications   Medication Instructions    BD Luer-Mariama Syringe 3 mL 18 x 1 1/2\" syringe 1 Needle, abdominal subcutaneous, Daily    fluticasone (Flonase) 50 mcg/actuation nasal spray Administer into affected nostril(s).    levothyroxine (SYNTHROID, LEVOXYL) 175 mcg, oral, Daily, as directed    lisinopriL-hydrochlorothiazide 20-12.5 mg tablet 1 tablet, oral, Daily    meloxicam (MOBIC) 15 mg, oral, Daily    methylPREDNISolone (Medrol Dospak) 4 mg tablets Follow schedule on package instructions    sildenafil (VIAGRA) 100 mg, oral, Daily PRN    testosterone cypionate (Depo-Testosterone) 200 mg/mL injection Inject 200 mg/ml every 3 weeks    triamcinolone (Kenalog) 0.1 % cream Topical, 2 times daily    Wegovy 2.4 mg, subcutaneous, Weekly      Vitals  BP Readings from Last 2 Encounters:   06/07/25 107/61 " "  04/18/25 120/80     BMI Readings from Last 1 Encounters:   06/07/25 31.87 kg/m²      Labs  A1C  Lab Results   Component Value Date    HGBA1C 5.3 04/05/2025    HGBA1C 5.5 04/17/2024    HGBA1C 5.5 02/02/2023     BMP  Lab Results   Component Value Date    CALCIUM 9.6 04/05/2025     04/05/2025    K 4.4 04/05/2025    CO2 27 04/05/2025     04/05/2025    BUN 19 04/05/2025    CREATININE 0.77 04/05/2025    EGFR 103 04/05/2025     LFTs  Lab Results   Component Value Date    ALT 17 04/05/2025    AST 17 04/05/2025    ALKPHOS 47 04/05/2025    BILITOT 0.8 04/05/2025     FLP  Lab Results   Component Value Date    TRIG 116 04/17/2024    CHOL 195 04/17/2024    LDLF 117 (H) 02/02/2023    LDLCALC 129 (H) 04/17/2024    HDL 43.2 04/17/2024     Urine Microalbumin  No results found for: \"MICROALBCREA\"  Weight Management  Wt Readings from Last 3 Encounters:   06/07/25 107 kg (235 lb)   04/18/25 112 kg (246 lb)   09/20/24 126 kg (277 lb)      There is no height or weight on file to calculate BMI.    Assessment/Plan   Problem List Items Addressed This Visit          Cardiac and Vasculature    Benign essential hypertension    Relevant Medications    semaglutide, weight loss, (Wegovy) 2.4 mg/0.75 mL pen injector    Hyperlipidemia    Relevant Medications    semaglutide, weight loss, (Wegovy) 2.4 mg/0.75 mL pen injector     Other Visit Diagnoses         Obesity, unspecified class, unspecified obesity type, unspecified whether serious comorbidity present    -  Primary    Relevant Medications    semaglutide, weight loss, (Wegovy) 2.4 mg/0.75 mL pen injector      Prediabetes          Pre-diabetes        Relevant Medications    semaglutide, weight loss, (Wegovy) 2.4 mg/0.75 mL pen injector          Patient's current weight reported as 235lbs. Has lost ~110lbs. since starting therapy plan.  Goal weight 220lbs, lose 20 more lbs per report  Current regimen includes: Wegovy 2.4mg  Will:  Continue current regimen, patient is doing " excellent  Reports he is going to lapse in insurance coverage for the Wegovy. States the insurance will not cover Wegovy at the beginning of the year. I told him I could try to see if we can PA it for him at that time. He states he will let me know.     Patient Education:  Counseled patient on relevant medication mechanisms of action, expectations, side effects, duration of therapy, contraindications, administration, and monitoring parameters.  All questions and concerns addressed. Contact pharmacist with any further questions or concerns prior to next appointment.    Clinical Pharmacist follow-up: as needed, may re-refer if needed, Telehealth visit  Patient is not followed in CCM.    Thank you,  Lowell San PharmD, [facility]  Clinical Pharmacy Specialist-Primary Care  [phone]    Continue all meds under the continuation of care with the referring provider and clinical pharmacy team.  Verbal consent to manage patient's drug therapy was obtained from the patient. They were informed they may decline to participate or withdraw from participation in pharmacy services at any time.

## 2025-06-26 ENCOUNTER — LAB (OUTPATIENT)
Dept: LAB | Facility: HOSPITAL | Age: 60
End: 2025-06-26
Payer: COMMERCIAL

## 2025-06-26 DIAGNOSIS — D72.829 LEUKOCYTOSIS, UNSPECIFIED TYPE: ICD-10-CM

## 2025-06-26 DIAGNOSIS — N52.9 IMPOTENCE: ICD-10-CM

## 2025-06-26 DIAGNOSIS — D72.829 ELEVATED WHITE BLOOD CELL COUNT, UNSPECIFIED: Primary | ICD-10-CM

## 2025-06-26 LAB
BASOPHILS # BLD AUTO: 0.07 X10*3/UL (ref 0–0.1)
BASOPHILS NFR BLD AUTO: 0.7 %
EOSINOPHIL # BLD AUTO: 0.19 X10*3/UL (ref 0–0.7)
EOSINOPHIL NFR BLD AUTO: 1.9 %
ERYTHROCYTE [DISTWIDTH] IN BLOOD BY AUTOMATED COUNT: 12.3 % (ref 11.5–14.5)
HCT VFR BLD AUTO: 43 % (ref 41–52)
HGB BLD-MCNC: 14.2 G/DL (ref 13.5–17.5)
IMM GRANULOCYTES # BLD AUTO: 0.06 X10*3/UL (ref 0–0.7)
IMM GRANULOCYTES NFR BLD AUTO: 0.6 % (ref 0–0.9)
LYMPHOCYTES # BLD AUTO: 2.56 X10*3/UL (ref 1.2–4.8)
LYMPHOCYTES NFR BLD AUTO: 25.2 %
MCH RBC QN AUTO: 30.3 PG (ref 26–34)
MCHC RBC AUTO-ENTMCNC: 33 G/DL (ref 32–36)
MCV RBC AUTO: 92 FL (ref 80–100)
MONOCYTES # BLD AUTO: 0.87 X10*3/UL (ref 0.1–1)
MONOCYTES NFR BLD AUTO: 8.6 %
NEUTROPHILS # BLD AUTO: 6.41 X10*3/UL (ref 1.2–7.7)
NEUTROPHILS NFR BLD AUTO: 63 %
NRBC BLD-RTO: 0 /100 WBCS (ref 0–0)
PLATELET # BLD AUTO: 365 X10*3/UL (ref 150–450)
RBC # BLD AUTO: 4.68 X10*6/UL (ref 4.5–5.9)
WBC # BLD AUTO: 10.2 X10*3/UL (ref 4.4–11.3)

## 2025-06-26 PROCEDURE — 36415 COLL VENOUS BLD VENIPUNCTURE: CPT

## 2025-06-26 PROCEDURE — 85025 COMPLETE CBC W/AUTO DIFF WBC: CPT

## 2025-06-30 RX ORDER — TESTOSTERONE CYPIONATE 200 MG/ML
INJECTION, SOLUTION INTRAMUSCULAR
Qty: 6 ML | Refills: 1 | Status: SHIPPED | OUTPATIENT
Start: 2025-06-30

## 2025-07-01 DIAGNOSIS — I10 PRIMARY HYPERTENSION: ICD-10-CM

## 2025-07-02 RX ORDER — LISINOPRIL AND HYDROCHLOROTHIAZIDE 12.5; 2 MG/1; MG/1
2 TABLET ORAL DAILY
Qty: 180 TABLET | Refills: 1 | Status: SHIPPED | OUTPATIENT
Start: 2025-07-02

## 2025-07-07 ENCOUNTER — APPOINTMENT (OUTPATIENT)
Dept: HEMATOLOGY/ONCOLOGY | Facility: CLINIC | Age: 60
End: 2025-07-07
Payer: COMMERCIAL

## 2025-07-07 VITALS
DIASTOLIC BLOOD PRESSURE: 73 MMHG | SYSTOLIC BLOOD PRESSURE: 117 MMHG | HEART RATE: 88 BPM | HEIGHT: 71 IN | RESPIRATION RATE: 18 BRPM | TEMPERATURE: 97.7 F | WEIGHT: 246.81 LBS | BODY MASS INDEX: 34.55 KG/M2 | OXYGEN SATURATION: 97 %

## 2025-07-07 DIAGNOSIS — D72.829 LEUKOCYTOSIS, UNSPECIFIED TYPE: Primary | ICD-10-CM

## 2025-07-07 PROCEDURE — 3078F DIAST BP <80 MM HG: CPT | Performed by: NURSE PRACTITIONER

## 2025-07-07 PROCEDURE — 99203 OFFICE O/P NEW LOW 30 MIN: CPT | Performed by: NURSE PRACTITIONER

## 2025-07-07 PROCEDURE — 3074F SYST BP LT 130 MM HG: CPT | Performed by: NURSE PRACTITIONER

## 2025-07-07 PROCEDURE — 3008F BODY MASS INDEX DOCD: CPT | Performed by: NURSE PRACTITIONER

## 2025-07-07 PROCEDURE — 99213 OFFICE O/P EST LOW 20 MIN: CPT | Performed by: NURSE PRACTITIONER

## 2025-07-07 ASSESSMENT — PAIN SCALES - GENERAL: PAINLEVEL_OUTOF10: 0-NO PAIN

## 2025-07-07 NOTE — PROGRESS NOTES
"Patient ID: Jose Franco is a 59 y.o. male.  Referring Physician: Alan Sheets MD  [address]   [address]  [place] [address]  Primary Care Provider: Alan Sheets MD  Visit Type: Initial Visit      Subjective    HPI  60yo referred for elevated WBC 4/5/25 WBC 18.5, hgb 15.8 and plt 357 after being on prednisone for his knee. He has lost 100 pounds over the past year with effort.  He denies fevers, night sweats or lymphadenopathy.  He states energy is good and he feels well.   Repeat CBC today WBC 10.2    Review of Systems - Oncology Asymptomatic    Objective   BSA: 2.38 meters squared  /73 (BP Location: Left arm, Patient Position: Sitting, BP Cuff Size: Adult long)   Pulse 88   Temp 36.5 °C (97.7 °F) (Temporal)   Resp 18   Ht (S) 1.815 m (5' 11.46\")   Wt 112 kg (246 lb 12.9 oz)   SpO2 97%   BMI 33.98 kg/m²      has a past medical history of Allergic, Arthritis, Body mass index (BMI) 45.0-49.9, adult (Multi) (03/15/2021), Disease of thyroid gland (2000), Eczema, Hyperlipidemia, unspecified (08/03/2022), Hypertension (2015), Hypothyroidism, unspecified (08/03/2022), and Personal history of other mental and behavioral disorders (08/12/2019).   has a past surgical history that includes Total hip arthroplasty (06/26/2013); Other surgical history (09/25/2020); Ankle arthroscopy w/ open repair (11/08/2017); Joint replacement; and [place] tooth extraction.  Family History[1]      Yassine Franco \"Jose\"  reports that he quit smoking about 10 years ago. His smoking use included cigarettes. He started smoking about 45 years ago. He has a 82.5 pack-year smoking history. He has never used smokeless tobacco.  He  reports that he does not currently use alcohol.  He  reports that he does not currently use drugs.    Physical Exam  Constitutional:       Appearance: Normal appearance.   Eyes:      Conjunctiva/sclera: Conjunctivae normal.   Cardiovascular:      Rate and Rhythm: Normal rate and regular " rhythm.      Pulses: Normal pulses.      Heart sounds: Normal heart sounds. No murmur heard.  Pulmonary:      Effort: Pulmonary effort is normal. No respiratory distress.      Breath sounds: Normal breath sounds. No stridor. No wheezing or rales.   Abdominal:      General: There is no distension.      Palpations: Abdomen is soft. There is no mass.      Tenderness: There is no abdominal tenderness.   Musculoskeletal:         General: No swelling, tenderness, deformity or signs of injury. Normal range of motion.   Lymphadenopathy:      Cervical: No cervical adenopathy.   Skin:     Coloration: Skin is not jaundiced or pale.      Findings: No bruising.   Neurological:      Motor: No weakness.     WBC   Date/Time Value Ref Range Status   06/26/2025 10:44 PM 10.2 4.4 - 11.3 x10*3/uL Final   04/17/2024 09:59 AM 10.5 4.4 - 11.3 x10*3/uL Final   09/25/2023 09:23 AM 12.1 (H) 4.4 - 11.3 x10E9/L Final   02/02/2023 09:17 AM 10.9 4.4 - 11.3 x10E9/L Final   02/02/2022 10:19 AM 9.6 4.4 - 11.3 x10E9/L Final     WHITE BLOOD CELL COUNT   Date/Time Value Ref Range Status   04/05/2025 08:08 AM 18.5 (H) 3.8 - 10.8 Thousand/uL Final     nRBC   Date Value Ref Range Status   06/26/2025 0.0 0.0 - 0.0 /100 WBCs Final   04/17/2024 0.0 0.0 - 0.0 /100 WBCs Final   09/25/2023 0.0 0.0 - 0.0 /100 WBC Final   02/02/2023 0.0 0.0 - 0.0 /100 WBC Final   02/02/2022 0.0 0.0 - 0.0 /100 WBC Final     RBC   Date Value Ref Range Status   06/26/2025 4.68 4.50 - 5.90 x10*6/uL Final   04/17/2024 5.71 4.50 - 5.90 x10*6/uL Final   09/25/2023 5.46 4.50 - 5.90 x10E12/L Final   02/02/2023 5.62 4.50 - 5.90 x10E12/L Final   02/02/2022 5.56 4.50 - 5.90 x10E12/L Final     RED BLOOD CELL COUNT   Date Value Ref Range Status   04/05/2025 4.97 4.20 - 5.80 Million/uL Final     Hemoglobin   Date Value Ref Range Status   06/26/2025 14.2 13.5 - 17.5 g/dL Final   04/17/2024 17.4 13.5 - 17.5 g/dL Final   09/25/2023 16.7 13.5 - 17.5 g/dL Final   02/02/2023 16.7 13.5 - 17.5 g/dL  Final   02/02/2022 17.2 13.5 - 17.5 g/dL Final     HEMOGLOBIN   Date Value Ref Range Status   04/05/2025 15.8 13.2 - 17.1 g/dL Final     Hematocrit   Date Value Ref Range Status   06/26/2025 43.0 41.0 - 52.0 % Final   04/17/2024 51.8 41.0 - 52.0 % Final   09/25/2023 50.7 41.0 - 52.0 % Final   02/02/2023 49.2 41.0 - 52.0 % Final   02/02/2022 50.1 41.0 - 52.0 % Final     HEMATOCRIT   Date Value Ref Range Status   04/05/2025 46.8 38.5 - 50.0 % Final     MCV   Date/Time Value Ref Range Status   06/26/2025 10:44 PM 92 80 - 100 fL Final   04/05/2025 08:08 AM 94.2 80.0 - 100.0 fL Final   04/17/2024 09:59 AM 91 80 - 100 fL Final   09/25/2023 09:23 AM 93 80 - 100 fL Final   02/02/2023 09:17 AM 88 80 - 100 fL Final   02/02/2022 10:19 AM 90 80 - 100 fL Final     MCH   Date/Time Value Ref Range Status   06/26/2025 10:44 PM 30.3 26.0 - 34.0 pg Final   04/05/2025 08:08 AM 31.8 27.0 - 33.0 pg Final   04/17/2024 09:59 AM 30.5 26.0 - 34.0 pg Final     MCHC   Date/Time Value Ref Range Status   06/26/2025 10:44 PM 33.0 32.0 - 36.0 g/dL Final   04/05/2025 08:08 AM 33.8 32.0 - 36.0 g/dL Final     Comment:     For adults, a slight decrease in the calculated MCHC  value (in the range of 30 to 32 g/dL) is most likely  not clinically significant; however, it should be  interpreted with caution in correlation with other  red cell parameters and the patient's clinical  condition.     04/17/2024 09:59 AM 33.6 32.0 - 36.0 g/dL Final   09/25/2023 09:23 AM 32.9 32.0 - 36.0 g/dL Final   02/02/2023 09:17 AM 33.9 32.0 - 36.0 g/dL Final   02/02/2022 10:19 AM 34.3 32.0 - 36.0 g/dL Final     RDW   Date/Time Value Ref Range Status   06/26/2025 10:44 PM 12.3 11.5 - 14.5 % Final   04/05/2025 08:08 AM 12.2 11.0 - 15.0 % Final   04/17/2024 09:59 AM 12.6 11.5 - 14.5 % Final   09/25/2023 09:23 AM 12.2 11.5 - 14.5 % Final   02/02/2023 09:17 AM 12.0 11.5 - 14.5 % Final   02/02/2022 10:19 AM 11.9 11.5 - 14.5 % Final     Platelets   Date/Time Value Ref Range  Status   06/26/2025 10:44  150 - 450 x10*3/uL Final   04/17/2024 09:59  150 - 450 x10*3/uL Final   09/25/2023 09:23  150 - 450 x10E9/L Final   02/02/2023 09:17  150 - 450 x10E9/L Final   02/02/2022 10:19  150 - 450 x10E9/L Final     PLATELET COUNT   Date/Time Value Ref Range Status   04/05/2025 08:08  140 - 400 Thousand/uL Final     MPV   Date/Time Value Ref Range Status   04/05/2025 08:08 AM 9.5 7.5 - 12.5 fL Final     Neutrophils %   Date/Time Value Ref Range Status   06/26/2025 10:44 PM 63.0 40.0 - 80.0 % Final   04/17/2024 09:59 AM 63.3 40.0 - 80.0 % Final     Immature Granulocytes %, Automated   Date/Time Value Ref Range Status   06/26/2025 10:44 PM 0.6 0.0 - 0.9 % Final     Comment:     Immature Granulocyte Count (IG) includes promyelocytes, myelocytes and metamyelocytes but does not include bands. Percent differential counts (%) should be interpreted in the context of the absolute cell counts (cells/UL).   04/17/2024 09:59 AM 0.6 0.0 - 0.9 % Final     Comment:     Immature Granulocyte Count (IG) includes promyelocytes, myelocytes and metamyelocytes but does not include bands. Percent differential counts (%) should be interpreted in the context of the absolute cell counts (cells/UL).     Lymphocytes %   Date/Time Value Ref Range Status   06/26/2025 10:44 PM 25.2 13.0 - 44.0 % Final   04/17/2024 09:59 AM 25.0 13.0 - 44.0 % Final     LYMPHOCYTES   Date/Time Value Ref Range Status   04/05/2025 08:08 AM 12.5 % Final     Monocytes %   Date/Time Value Ref Range Status   06/26/2025 10:44 PM 8.6 2.0 - 10.0 % Final   04/17/2024 09:59 AM 8.4 2.0 - 10.0 % Final     MONOCYTES   Date/Time Value Ref Range Status   04/05/2025 08:08 AM 4.7 % Final     Eosinophils %   Date/Time Value Ref Range Status   06/26/2025 10:44 PM 1.9 0.0 - 6.0 % Final   04/17/2024 09:59 AM 1.7 0.0 - 6.0 % Final     EOSINOPHILS   Date/Time Value Ref Range Status   04/05/2025 08:08 AM 0.4 % Final     Basophils %    Date/Time Value Ref Range Status   06/26/2025 10:44 PM 0.7 0.0 - 2.0 % Final   04/17/2024 09:59 AM 1.0 0.0 - 2.0 % Final     BASOPHILS   Date/Time Value Ref Range Status   04/05/2025 08:08 AM 0.4 % Final     Neutrophils Absolute   Date/Time Value Ref Range Status   06/26/2025 10:44 PM 6.41 1.20 - 7.70 x10*3/uL Final     Comment:     Percent differential counts (%) should be interpreted in the context of the absolute cell counts (cells/uL).   04/17/2024 09:59 AM 6.64 1.20 - 7.70 x10*3/uL Final     Comment:     Percent differential counts (%) should be interpreted in the context of the absolute cell counts (cells/uL).     Immature Granulocytes Absolute, Automated   Date/Time Value Ref Range Status   06/26/2025 10:44 PM 0.06 0.00 - 0.70 x10*3/uL Final   04/17/2024 09:59 AM 0.06 0.00 - 0.70 x10*3/uL Final     Lymphocytes Absolute   Date/Time Value Ref Range Status   06/26/2025 10:44 PM 2.56 1.20 - 4.80 x10*3/uL Final   04/17/2024 09:59 AM 2.62 1.20 - 4.80 x10*3/uL Final     Monocytes Absolute   Date/Time Value Ref Range Status   06/26/2025 10:44 PM 0.87 0.10 - 1.00 x10*3/uL Final   04/17/2024 09:59 AM 0.88 0.10 - 1.00 x10*3/uL Final     Eosinophils Absolute   Date/Time Value Ref Range Status   06/26/2025 10:44 PM 0.19 0.00 - 0.70 x10*3/uL Final   04/17/2024 09:59 AM 0.18 0.00 - 0.70 x10*3/uL Final     ABSOLUTE EOSINOPHILS   Date/Time Value Ref Range Status   04/05/2025 08:08 AM 74 15 - 500 cells/uL Final     Basophils Absolute   Date/Time Value Ref Range Status   06/26/2025 10:44 PM 0.07 0.00 - 0.10 x10*3/uL Final   04/17/2024 09:59 AM 0.10 0.00 - 0.10 x10*3/uL Final     ABSOLUTE BASOPHILS   Date/Time Value Ref Range Status   04/05/2025 08:08 AM 74 0 - 200 cells/uL Final         Assessment/Plan    Leukocytosis related to steroid use  PCP will recheck labs in October Diagnoses and all orders for this visit:  Leukocytosis, unspecified type  -     CBC and Auto Differential; Future  -     Referral To Hematology and  Oncology           Yanci Burkett PA-C                              [1]   Family History  Problem Relation Name Age of Onset    Cancer Mother [place]

## 2025-08-03 DIAGNOSIS — R68.82 LIBIDO, DECREASED: ICD-10-CM

## 2025-08-04 RX ORDER — SILDENAFIL 100 MG/1
100 TABLET, FILM COATED ORAL DAILY PRN
Qty: 10 TABLET | Refills: 0 | Status: SHIPPED | OUTPATIENT
Start: 2025-08-04

## 2025-08-08 DIAGNOSIS — N52.9 IMPOTENCE: ICD-10-CM

## 2025-08-08 RX ORDER — SYRINGE WITH NEEDLE, 1 ML 25GX5/8"
SYRINGE, EMPTY DISPOSABLE MISCELLANEOUS
Qty: 30 EACH | Refills: 3 | Status: SHIPPED | OUTPATIENT
Start: 2025-08-08

## 2025-10-10 ENCOUNTER — APPOINTMENT (OUTPATIENT)
Dept: PRIMARY CARE | Facility: CLINIC | Age: 60
End: 2025-10-10
Payer: COMMERCIAL